# Patient Record
Sex: MALE | Race: WHITE | NOT HISPANIC OR LATINO
[De-identification: names, ages, dates, MRNs, and addresses within clinical notes are randomized per-mention and may not be internally consistent; named-entity substitution may affect disease eponyms.]

---

## 2017-04-22 ENCOUNTER — APPOINTMENT (OUTPATIENT)
Dept: POPULATION HEALTH | Facility: CLINIC | Age: 67
End: 2017-04-22
Payer: COMMERCIAL

## 2017-04-22 ENCOUNTER — OUTPATIENT (OUTPATIENT)
Dept: OUTPATIENT SERVICES | Facility: HOSPITAL | Age: 67
LOS: 1 days | End: 2017-04-22
Payer: COMMERCIAL

## 2017-04-22 DIAGNOSIS — Z77.090 CONTACT WITH AND (SUSPECTED) EXPOSURE TO ASBESTOS: ICD-10-CM

## 2017-04-22 PROCEDURE — 71250 CT THORAX DX C-: CPT

## 2017-04-22 PROCEDURE — 36415 COLL VENOUS BLD VENIPUNCTURE: CPT

## 2017-04-22 PROCEDURE — 93000 ELECTROCARDIOGRAM COMPLETE: CPT

## 2017-04-22 PROCEDURE — 71250 CT THORAX DX C-: CPT | Mod: 26

## 2017-04-22 PROCEDURE — 99212 OFFICE O/P EST SF 10 MIN: CPT | Mod: 25

## 2017-04-22 PROCEDURE — 94010 BREATHING CAPACITY TEST: CPT

## 2018-04-28 ENCOUNTER — APPOINTMENT (OUTPATIENT)
Dept: POPULATION HEALTH | Facility: CLINIC | Age: 68
End: 2018-04-28
Payer: COMMERCIAL

## 2018-04-28 ENCOUNTER — OUTPATIENT (OUTPATIENT)
Dept: OUTPATIENT SERVICES | Facility: HOSPITAL | Age: 68
LOS: 1 days | End: 2018-04-28
Payer: COMMERCIAL

## 2018-04-28 DIAGNOSIS — Z77.090 CONTACT WITH AND (SUSPECTED) EXPOSURE TO ASBESTOS: ICD-10-CM

## 2018-04-28 PROCEDURE — 99212 OFFICE O/P EST SF 10 MIN: CPT | Mod: 25

## 2018-04-28 PROCEDURE — 94010 BREATHING CAPACITY TEST: CPT

## 2018-04-28 PROCEDURE — 36415 COLL VENOUS BLD VENIPUNCTURE: CPT

## 2018-04-28 PROCEDURE — 93000 ELECTROCARDIOGRAM COMPLETE: CPT

## 2018-04-28 PROCEDURE — 71250 CT THORAX DX C-: CPT | Mod: 26

## 2018-04-28 PROCEDURE — 71250 CT THORAX DX C-: CPT

## 2019-04-13 ENCOUNTER — APPOINTMENT (OUTPATIENT)
Age: 69
End: 2019-04-13
Payer: COMMERCIAL

## 2019-04-13 ENCOUNTER — OUTPATIENT (OUTPATIENT)
Dept: OUTPATIENT SERVICES | Facility: HOSPITAL | Age: 69
LOS: 1 days | End: 2019-04-13
Payer: COMMERCIAL

## 2019-04-13 DIAGNOSIS — Z77.090 CONTACT WITH AND (SUSPECTED) EXPOSURE TO ASBESTOS: ICD-10-CM

## 2019-04-13 PROCEDURE — 36415 COLL VENOUS BLD VENIPUNCTURE: CPT

## 2019-04-13 PROCEDURE — 93000 ELECTROCARDIOGRAM COMPLETE: CPT

## 2019-04-13 PROCEDURE — 99212 OFFICE O/P EST SF 10 MIN: CPT | Mod: 25

## 2019-04-13 PROCEDURE — 94010 BREATHING CAPACITY TEST: CPT

## 2019-04-13 PROCEDURE — 71250 CT THORAX DX C-: CPT

## 2019-04-13 PROCEDURE — 71250 CT THORAX DX C-: CPT | Mod: 26

## 2022-04-09 ENCOUNTER — OUTPATIENT (OUTPATIENT)
Dept: OUTPATIENT SERVICES | Facility: HOSPITAL | Age: 72
LOS: 1 days | End: 2022-04-09
Payer: COMMERCIAL

## 2022-04-09 ENCOUNTER — RESULT REVIEW (OUTPATIENT)
Age: 72
End: 2022-04-09

## 2022-04-09 ENCOUNTER — APPOINTMENT (OUTPATIENT)
Dept: POPULATION HEALTH | Facility: CLINIC | Age: 72
End: 2022-04-09

## 2022-04-09 DIAGNOSIS — Z77.090 CONTACT WITH AND (SUSPECTED) EXPOSURE TO ASBESTOS: ICD-10-CM

## 2022-04-09 PROCEDURE — 71250 CT THORAX DX C-: CPT | Mod: 26

## 2022-04-09 PROCEDURE — 71250 CT THORAX DX C-: CPT

## 2022-09-21 ENCOUNTER — LAB (OUTPATIENT)
Dept: LAB | Facility: HOSPITAL | Age: 72
End: 2022-09-21

## 2022-09-21 ENCOUNTER — OFFICE VISIT (OUTPATIENT)
Dept: FAMILY MEDICINE CLINIC | Facility: CLINIC | Age: 72
End: 2022-09-21

## 2022-09-21 VITALS
WEIGHT: 220.4 LBS | HEIGHT: 69 IN | RESPIRATION RATE: 20 BRPM | OXYGEN SATURATION: 98 % | DIASTOLIC BLOOD PRESSURE: 68 MMHG | TEMPERATURE: 97.1 F | BODY MASS INDEX: 32.64 KG/M2 | SYSTOLIC BLOOD PRESSURE: 124 MMHG | HEART RATE: 54 BPM

## 2022-09-21 DIAGNOSIS — E78.5 HYPERLIPIDEMIA, UNSPECIFIED HYPERLIPIDEMIA TYPE: ICD-10-CM

## 2022-09-21 DIAGNOSIS — Z00.00 ANNUAL PHYSICAL EXAM: Primary | ICD-10-CM

## 2022-09-21 DIAGNOSIS — E66.9 OBESITY (BMI 30-39.9): ICD-10-CM

## 2022-09-21 DIAGNOSIS — I10 HYPERTENSION, UNSPECIFIED TYPE: ICD-10-CM

## 2022-09-21 DIAGNOSIS — R01.1 MURMUR, CARDIAC: ICD-10-CM

## 2022-09-21 DIAGNOSIS — Z00.00 ANNUAL PHYSICAL EXAM: ICD-10-CM

## 2022-09-21 DIAGNOSIS — Z77.090 EXPOSURE TO ASBESTOS: ICD-10-CM

## 2022-09-21 PROBLEM — D12.6 ADENOMATOUS POLYP OF COLON: Status: ACTIVE | Noted: 2018-08-06

## 2022-09-21 PROBLEM — K57.30 DIVERTICULAR DISEASE OF COLON: Status: ACTIVE | Noted: 2018-09-20

## 2022-09-21 PROCEDURE — 86803 HEPATITIS C AB TEST: CPT

## 2022-09-21 PROCEDURE — 90715 TDAP VACCINE 7 YRS/> IM: CPT | Performed by: STUDENT IN AN ORGANIZED HEALTH CARE EDUCATION/TRAINING PROGRAM

## 2022-09-21 PROCEDURE — 99387 INIT PM E/M NEW PAT 65+ YRS: CPT | Performed by: STUDENT IN AN ORGANIZED HEALTH CARE EDUCATION/TRAINING PROGRAM

## 2022-09-21 PROCEDURE — 99214 OFFICE O/P EST MOD 30 MIN: CPT | Performed by: STUDENT IN AN ORGANIZED HEALTH CARE EDUCATION/TRAINING PROGRAM

## 2022-09-21 PROCEDURE — 90471 IMMUNIZATION ADMIN: CPT | Performed by: STUDENT IN AN ORGANIZED HEALTH CARE EDUCATION/TRAINING PROGRAM

## 2022-09-21 PROCEDURE — 80061 LIPID PANEL: CPT

## 2022-09-21 PROCEDURE — 83036 HEMOGLOBIN GLYCOSYLATED A1C: CPT

## 2022-09-21 PROCEDURE — 80053 COMPREHEN METABOLIC PANEL: CPT

## 2022-09-21 PROCEDURE — 90662 IIV NO PRSV INCREASED AG IM: CPT | Performed by: STUDENT IN AN ORGANIZED HEALTH CARE EDUCATION/TRAINING PROGRAM

## 2022-09-21 PROCEDURE — G0008 ADMIN INFLUENZA VIRUS VAC: HCPCS | Performed by: STUDENT IN AN ORGANIZED HEALTH CARE EDUCATION/TRAINING PROGRAM

## 2022-09-21 RX ORDER — ASCORBIC ACID 500 MG
TABLET ORAL
COMMUNITY

## 2022-09-21 RX ORDER — ASPIRIN 81 MG/1
TABLET ORAL
COMMUNITY

## 2022-09-21 RX ORDER — AMOXICILLIN 250 MG
CAPSULE ORAL
COMMUNITY

## 2022-09-21 RX ORDER — ATENOLOL 25 MG/1
25 TABLET ORAL DAILY
Qty: 30 TABLET | Refills: 5 | Status: SHIPPED | OUTPATIENT
Start: 2022-09-21 | End: 2023-03-17

## 2022-09-21 RX ORDER — ASCORBIC ACID 100 MG
TABLET,CHEWABLE ORAL
COMMUNITY

## 2022-09-21 RX ORDER — VIT C/B6/B5/MAGNESIUM/HERB 173 50-5-6-5MG
400 CAPSULE ORAL DAILY
COMMUNITY

## 2022-09-21 RX ORDER — ATORVASTATIN CALCIUM 10 MG/1
10 TABLET, FILM COATED ORAL NIGHTLY
Qty: 90 TABLET | Refills: 1 | Status: SHIPPED | OUTPATIENT
Start: 2022-09-21 | End: 2022-09-22 | Stop reason: SDUPTHER

## 2022-09-21 RX ORDER — ATENOLOL 25 MG/1
TABLET ORAL
COMMUNITY
End: 2022-09-21 | Stop reason: SDUPTHER

## 2022-09-21 RX ORDER — ATORVASTATIN CALCIUM 10 MG/1
TABLET, FILM COATED ORAL
COMMUNITY
End: 2022-09-21 | Stop reason: SDUPTHER

## 2022-09-21 NOTE — PATIENT INSTRUCTIONS

## 2022-09-21 NOTE — PROGRESS NOTES
Office Note     Name: Juan Hebert    : 1950     MRN: [id number]     Chief Complaint  Annual Exam (Establish care/Med refill )    Subjective     History of Present Illness:  Juan Hebert is a 72 y.o. male who presents today to establish care, get a physical and medication refills.  He and his wife moved from [place] about a month ago, [place].     HTN: He is on atenolol 25 mg daily. He has been on this medication for about 12 years.  He is unsure why this medication was selected. He denies any chest pain, dizziness, headaches, or blurry vision.      Murmur: He did see a cardiologist, Dr. Farr, for a leaky valve. He would have annual visits and had a stress ECHO 10/4/2021. He would like a cardiology referral today.    HLD: He is on atorvastatin 10 mg daily.  Denies any myalgias or side effects with medication.    SH:  - Patient is a retired   - He reports only occasional alcohol use, no recreational drug use, smoked briefly for 5 years and quit in .  - He lives with his wife  - Has been working on losing weight.  He was originally 250 pounds but is now 220 pounds.  He walks every day with the dog, stretches every morning, and tries to eat a salad daily, particularly enjoys kale salad.  He does have a diet rich in protein, occluding meat.    HM:   - Just had a dental appt 6 months ago, gets annual eye exams -just had one a few months ago  - PHQ-2 of 0  - Declines shingles vaccine today, was told by his previous doctor that it was approximately 50% beneficial, and patient decided not to get shingles vaccine  - Declines COVID vaccine - does not feel that the vaccine has undergone enough rigorous testing  - AAA - declines, thinks he may have had this done in the past but will have to check his records  - CRC screening -has family history of colon cancer in his mother, she was diagnosed in her 70s, last colonoscopy was in , had 3 hyperplastic polyps,  "due in 2026  - Due for Prevnar vaccine, has only had Pneumovax x2- will discuss at follow-up          Objective     Past Medical History:   Diagnosis Date   • Allergic    • Arthritis 6 yrs. Ago   • Hyperlipidemia    • Hypertension 20 years ago   • Obesity 40 years ago   • Visual impairment All my life     History reviewed. No pertinent surgical history.  Family History   Problem Relation Age of Onset   • Cancer Mother    • Heart disease Father         Heart attack at age 62   • Heart disease Brother        Vital Signs  /68 (BP Location: Left arm, Patient Position: Sitting, Cuff Size: Adult)   Pulse 54   Temp 97.1 °F (36.2 °C) (Infrared)   Resp 20   Ht 175.3 cm (69\")   Wt 100 kg (220 lb 6.4 oz)   SpO2 98%   BMI 32.55 kg/m²   Estimated body mass index is 32.55 kg/m² as calculated from the following:    Height as of this encounter: 175.3 cm (69\").    Weight as of this encounter: 100 kg (220 lb 6.4 oz).    Physical Exam  Vitals reviewed.   Constitutional:       Appearance: Normal appearance.   HENT:      Head: Normocephalic.      Right Ear: Tympanic membrane and external ear normal.      Left Ear: Tympanic membrane and external ear normal.      Nose: Nose normal.      Mouth/Throat:      Mouth: Mucous membranes are moist.   Eyes:      Extraocular Movements: Extraocular movements intact.   Cardiovascular:      Rate and Rhythm: Normal rate and regular rhythm.      Heart sounds: Normal heart sounds.   Pulmonary:      Effort: Pulmonary effort is normal. No respiratory distress.      Breath sounds: Normal breath sounds.   Abdominal:      General: Abdomen is flat.      Palpations: Abdomen is soft.   Musculoskeletal:      Cervical back: No rigidity.      Comments: Moving all extremities spontaneously   Skin:     General: Skin is warm and dry.   Neurological:      General: No focal deficit present.      Mental Status: He is alert and oriented to person, place, and time.   Psychiatric:         Mood and Affect: Mood " normal.         Behavior: Behavior normal.               Assessment and Plan     Diagnoses and all orders for this visit:    1. Annual physical exam (Primary)  Assessment & Plan:  - Discussed diet and exercise  - Recommend annual eye and dental exams   - PHQ-2 of 0 -no issues with mood  - Declines recommended shingles vaccine today, was told by his previous doctor that it was approximately 50% beneficial, and patient decided not to get shingles vaccine  - Declines recommended COVID vaccine - does not feel that the vaccine has undergone enough rigorous testing  - Tdap and flu shot administered during clinic visit today  - AAA - declines, thinks he may have had this done in the past but will have to check his records -we will follow-up  - CRC screening -has family history of colon cancer in his mother, she was diagnosed in her 70s, last colonoscopy was in 2021, had 3 hyperplastic polyps, due in 2026  - Due for Prevnar vaccine, has only had Pneumovax x2- will discuss at follow-up    Orders:  -     Fluzone High-Dose 65+yrs ([id number])  -     Hepatitis C Antibody; Future  -     Tdap Vaccine Greater Than or Equal To 8yo IM    2. Hypertension, unspecified type  Assessment & Plan:  - Controlled  - Will continue atenolol 25 mg once daily  - We will obtain CMP, lipid panel, hemoglobin A1c today    Orders:  -     Lipid Panel; Future  -     Hemoglobin A1c; Future  -     Comprehensive Metabolic Panel; Future    3. Hyperlipidemia, unspecified hyperlipidemia type  Assessment & Plan:  - Stable  - Continue atorvastatin 10 mg daily  - Will obtain lipid panel today      4. Murmur, cardiac  Assessment & Plan:  - Patient with history of heart murmur  - Followed with cardiology in [place] and would like to establish care with them here as well  - Recent echo stress test performed in 2021  - Will refer to cardiology for further management    Orders:  -     Ambulatory Referral to Cardiology    5. Obesity (BMI 30-39.9)  Assessment &  Plan:  - Patient has been working on diet and exercise, has lost approximately 30 pounds this year due to increasing his exercise  - Discussed diet and exercise during today's visit      6. Exposure to asbestos  Assessment & Plan:  - Has had significant asbestos exposure in the past  - Had recent evaluation by occupational Medicine, Epidemiology and Prevention in [facility] in 2022 with recommendation for repeat annual exam  - When patient is due can refer to pulmonary for further evaluation      Other orders  -     atenolol (TENORMIN) 25 MG tablet; Take 1 tablet by mouth Daily.  Dispense: 30 tablet; Refill: 5  -     atorvastatin (LIPITOR) 10 MG tablet; Take 1 tablet by mouth Every Night.  Dispense: 90 tablet; Refill: 1    BMI is >= 30 and <35. (Class 1 Obesity). The following options were offered after discussion;: exercise counseling/recommendations and nutrition counseling/recommendations    Follow Up  Return in about 6 months (around 3/21/2023) for HTN and HLD. -Will need Prevnar at follow-up and discussion about advanced directive    Anny Mills MD

## 2022-09-21 NOTE — ASSESSMENT & PLAN NOTE
- Discussed diet and exercise  - Recommend annual eye and dental exams   - PHQ-2 of 0 -no issues with mood  - Declines recommended shingles vaccine today, was told by his previous doctor that it was approximately 50% beneficial, and patient decided not to get shingles vaccine  - Declines recommended COVID vaccine - does not feel that the vaccine has undergone enough rigorous testing  - Tdap and flu shot administered during clinic visit today  - AAA - declines, thinks he may have had this done in the past but will have to check his records -we will follow-up  - CRC screening -has family history of colon cancer in his mother, she was diagnosed in her 70s, last colonoscopy was in 2021, had 3 hyperplastic polyps, due in 2026  - Due for Prevnar vaccine, has only had Pneumovax x2- will discuss at follow-up

## 2022-09-21 NOTE — ASSESSMENT & PLAN NOTE
- Has had significant asbestos exposure in the past  - Had recent evaluation by occupational Medicine, Epidemiology and Prevention in [facility] in 2022 with recommendation for repeat annual exam  - When patient is due can refer to pulmonary for further evaluation

## 2022-09-21 NOTE — ASSESSMENT & PLAN NOTE
- Controlled  - Will continue atenolol 25 mg once daily  - We will obtain CMP, lipid panel, hemoglobin A1c today

## 2022-09-21 NOTE — ASSESSMENT & PLAN NOTE
- Patient with history of heart murmur  - Followed with cardiology in [place] and would like to establish care with them here as well  - Recent echo stress test performed in 2021  - Will refer to cardiology for further management

## 2022-09-21 NOTE — ASSESSMENT & PLAN NOTE
- Patient has been working on diet and exercise, has lost approximately 30 pounds this year due to increasing his exercise  - Discussed diet and exercise during today's visit

## 2022-09-22 LAB
ALBUMIN SERPL-MCNC: 4.9 G/DL (ref 3.5–5.2)
ALBUMIN/GLOB SERPL: 1.7 G/DL
ALP SERPL-CCNC: 73 U/L (ref 39–117)
ALT SERPL W P-5'-P-CCNC: 18 U/L (ref 1–41)
ANION GAP SERPL CALCULATED.3IONS-SCNC: 11.4 MMOL/L (ref 5–15)
AST SERPL-CCNC: 29 U/L (ref 1–40)
BILIRUB SERPL-MCNC: 0.4 MG/DL (ref 0–1.2)
BUN SERPL-MCNC: 18 MG/DL (ref 8–23)
BUN/CREAT SERPL: 17 (ref 7–25)
CALCIUM SPEC-SCNC: 10.1 MG/DL (ref 8.6–10.5)
CHLORIDE SERPL-SCNC: 100 MMOL/L (ref 98–107)
CHOLEST SERPL-MCNC: 216 MG/DL (ref 0–200)
CO2 SERPL-SCNC: 25.6 MMOL/L (ref 22–29)
CREAT SERPL-MCNC: 1.06 MG/DL (ref 0.76–1.27)
EGFRCR SERPLBLD CKD-EPI 2021: 73.6 ML/MIN/1.73
GLOBULIN UR ELPH-MCNC: 2.9 GM/DL
GLUCOSE SERPL-MCNC: 90 MG/DL (ref 65–99)
HBA1C MFR BLD: 5.5 % (ref 4.8–5.6)
HCV AB SER DONR QL: NORMAL
HDLC SERPL-MCNC: 50 MG/DL (ref 40–60)
LDLC SERPL CALC-MCNC: 132 MG/DL (ref 0–100)
LDLC/HDLC SERPL: 2.55 {RATIO}
POTASSIUM SERPL-SCNC: 4.9 MMOL/L (ref 3.5–5.2)
PROT SERPL-MCNC: 7.8 G/DL (ref 6–8.5)
SODIUM SERPL-SCNC: 137 MMOL/L (ref 136–145)
TRIGL SERPL-MCNC: 192 MG/DL (ref 0–150)
VLDLC SERPL-MCNC: 34 MG/DL (ref 5–40)

## 2022-09-22 RX ORDER — ATORVASTATIN CALCIUM 20 MG/1
20 TABLET, FILM COATED ORAL NIGHTLY
Qty: 90 TABLET | Refills: 1 | Status: SHIPPED | OUTPATIENT
Start: 2022-09-22 | End: 2023-03-23 | Stop reason: SDUPTHER

## 2022-11-03 ENCOUNTER — OFFICE VISIT (OUTPATIENT)
Dept: CARDIOLOGY | Facility: CLINIC | Age: 72
End: 2022-11-03

## 2022-11-03 VITALS
WEIGHT: 226.8 LBS | OXYGEN SATURATION: 96 % | HEART RATE: 56 BPM | SYSTOLIC BLOOD PRESSURE: 130 MMHG | DIASTOLIC BLOOD PRESSURE: 70 MMHG | HEIGHT: 69 IN | BODY MASS INDEX: 33.59 KG/M2

## 2022-11-03 DIAGNOSIS — E78.5 HYPERLIPIDEMIA, UNSPECIFIED HYPERLIPIDEMIA TYPE: ICD-10-CM

## 2022-11-03 DIAGNOSIS — R01.1 MURMUR, CARDIAC: ICD-10-CM

## 2022-11-03 DIAGNOSIS — I10 ESSENTIAL HYPERTENSION: Primary | ICD-10-CM

## 2022-11-03 DIAGNOSIS — I35.1 MODERATE AORTIC REGURGITATION: ICD-10-CM

## 2022-11-03 PROCEDURE — 93000 ELECTROCARDIOGRAM COMPLETE: CPT | Performed by: INTERNAL MEDICINE

## 2022-11-03 PROCEDURE — 99204 OFFICE O/P NEW MOD 45 MIN: CPT | Performed by: INTERNAL MEDICINE

## 2022-11-03 NOTE — PROGRESS NOTES
OFFICE VISIT  NOTE  [facility] CARDIOLOGY MAIN CAMPUS      Name: Juan Hebert    Date: 11/3/2022  MRN:  [id number]  :  1950      REFERRING/PRIMARY PROVIDER:  Anny Mills MD    Chief Complaint   Patient presents with   • Hyperlipidemia   • Hypertension   • Heart Murmur       HPI: Juan Hebert is a 72 y.o. male who presents today for new consultation for aortic regurgitation.  Moved from [place] to [place] 10/2022.  Followed with a cardiologist Dr. MONTGOMERY in [place] since  for moderate aortic regurgitation.  On 1 echo in  it was called moderate to severe but most recent echo 10/2021 it was called moderate with EF of 60%.  He had a stress echo that was negative for ischemia 10/2021 in [place].  Walks his dog daily up to 1 to 2 miles per day with no chest pain or significant shortness of breath, denies lower extremity edema.    Past Medical History:   Diagnosis Date   • Allergic    • Arthritis 6 yrs. Ago   • Hyperlipidemia    • Hypertension 20 years ago   • Obesity 40 years ago   • Visual impairment All my life       History reviewed. No pertinent surgical history.    Social History     Socioeconomic History   • Marital status:    Tobacco Use   • Smoking status: Former     Packs/day: 1.00     Years: 5.00     Pack years: 5.00     Types: Cigarettes     Start date: 1968     Quit date: 1974     Years since quittin.5   • Smokeless tobacco: Never   Vaping Use   • Vaping Use: Never used   Substance and Sexual Activity   • Alcohol use: Not Currently     Comment: A glass once every 4 months.   • Drug use: Never   • Sexual activity: Yes     Partners: Female       Family History   Problem Relation Age of Onset   • Cancer Mother    • Heart disease Father         Heart attack at age 62   • Heart disease Brother         ROS:   Constitutional no fever,  no weight loss   Skin no rash, no subcutaneous nodules   Otolaryngeal no difficulty swallowing   Cardiovascular See  "HPI   Pulmonary no cough, no sputum production   Gastrointestinal no constipation, no diarrhea   Genitourinary no dysuria, no hematuria   Hematologic no easy bruisability, no abnormal bleeding   Musculoskeletal no muscle pain   Neurologic no dizziness, no falls         No Known Allergies      Current Outpatient Medications:   •  Ascorbic Acid (Vitamin C) 100 MG chewable tablet, Vitamin C, Disp: , Rfl:   •  aspirin 81 MG EC tablet, aspirin 81 mg tablet,delayed release  Take 1 tablet every day by oral route., Disp: , Rfl:   •  atenolol (TENORMIN) 25 MG tablet, Take 1 tablet by mouth Daily., Disp: 30 tablet, Rfl: 5  •  atorvastatin (LIPITOR) 20 MG tablet, Take 1 tablet by mouth Every Night., Disp: 90 tablet, Rfl: 1  •  Cobalamin Combinations (B-12) 100-5000 MCG sublingual tablet, B12, Disp: , Rfl:   •  FLAXSEED, LINSEED, PO, flaxseed, Disp: , Rfl:   •  Glucosamine-Chondroit-Vit C-Mn (GLUCOSAMINE 1500 COMPLEX PO), Take 2,000 mg by mouth., Disp: , Rfl:   •  Methylsulfonylmethane (MSM PO), Take 50 mg by mouth Daily., Disp: , Rfl:   •  Multiple Vitamins-Minerals (MULTIVITAMIN ADULT EXTRA C PO), multivitamin, Disp: , Rfl:   •  Red Yeast Rice Extract (RED YEAST RICE PO), Take 1,200 mg by mouth Daily., Disp: , Rfl:   •  Turmeric 500 MG capsule, Take 400 mg by mouth Daily., Disp: , Rfl:   •  Vitamin E 100 UNIT/GM cream, vitamin E, Disp: , Rfl:     Vitals:    11/03/22 1354   BP: 130/70   BP Location: Left arm   Patient Position: Sitting   Pulse: 56   SpO2: 96%   Weight: 103 kg (226 lb 12.8 oz)   Height: 175.3 cm (69.02\")     Body mass index is 33.48 kg/m².    PHYSICAL EXAM:    General Appearance:   · well developed  · well nourished  HENT:   · oropharynx moist  · lips not cyanotic  Neck:  · thyroid not enlarged  · supple  Respiratory:  · no respiratory distress  · normal breath sounds  · no rales  Cardiovascular:  · no jugular venous distention  · regular rhythm  · apical impulse normal  · S1 normal, S2 normal  · no S3, no S4 "   · no murmur  · no rub, no thrill  · carotid pulses normal; no bruit  · lower extremity edema: none      Musculoskeletal:  · no clubbing of fingers.   · normocephalic, head atraumatic  Skin:   · warm, dry  Psychiatric:  · judgement and insight appropriate  · normal mood and affect    RESULTS:     ECG 12 Lead    Date/Time: 11/3/2022 2:30 PM  Performed by: Forest Bautista MD  Authorized by: Forest Bautista MD   Comparison: not compared with previous ECG   Rhythm: sinus bradycardia  Rate: bradycardic  BPM: 57  Conduction: right bundle branch block  QRS axis: left    Clinical impression: abnormal EKG                  Labs:  Lab Results   Component Value Date    CHOL 216 (H) 09/21/2022    TRIG 192 (H) 09/21/2022    HDL 50 09/21/2022     (H) 09/21/2022    AST 29 09/21/2022    ALT 18 09/21/2022     Lab Results   Component Value Date    HGBA1C 5.50 09/21/2022     No components found for: CREATINININE  No results found for: EGFRIFNONA    Most recent PCP note, imaging tests, and labs reviewed.    ASSESSMENT:  Problem List Items Addressed This Visit        Cardiac and Vasculature    Essential hypertension - Primary    Relevant Orders    ECG 12 Lead    Hyperlipidemia    Relevant Orders    ECG 12 Lead    Moderate aortic regurgitation       PLAN:    1.  Moderate to severe aortic regurgitation:  Repeat echocardiogram for surveillance  If moderate we will repeat in 1 year if severe we will repeat in 6 months if asymptomatic    2.  Hypertension:  Long-term goal blood pressure less than 130/80, continue current medical therapy, well controlled    3.  Hyperlipidemia:  Goal LDL less than 100  Agree with increasing atorvastatin to 20 mg daily and repeat lipids in 6 months.    Advance Care Planning   ACP discussion was held with the patient during this visit. Patient does not have an advance directive, information provided.         Return to clinic in 6 months, or sooner as needed.    Thank you for the opportunity to share in  the care of your patient; please do not hesitate to call me with any questions.     Forest Bautista MD, [facility]  Office: [phone] [address] [place] [address]    11/03/22

## 2022-12-02 ENCOUNTER — HOSPITAL ENCOUNTER (OUTPATIENT)
Dept: CARDIOLOGY | Facility: HOSPITAL | Age: 72
Discharge: HOME | End: 2022-12-02
Admitting: INTERNAL MEDICINE

## 2022-12-02 VITALS
DIASTOLIC BLOOD PRESSURE: 78 MMHG | HEIGHT: 69 IN | BODY MASS INDEX: 33.47 KG/M2 | SYSTOLIC BLOOD PRESSURE: 157 MMHG | WEIGHT: 226 LBS

## 2022-12-02 DIAGNOSIS — I35.1 MODERATE AORTIC REGURGITATION: ICD-10-CM

## 2022-12-02 PROCEDURE — 93306 TTE W/DOPPLER COMPLETE: CPT

## 2022-12-02 PROCEDURE — 93306 TTE W/DOPPLER COMPLETE: CPT | Performed by: INTERNAL MEDICINE

## 2022-12-05 LAB
ASCENDING AORTA: 4 CM
BH CV ECHO MEAS - AI P1/2T: 682.3 MSEC
BH CV ECHO MEAS - AO MAX PG: 11.2 MMHG
BH CV ECHO MEAS - AO MEAN PG: 5.5 MMHG
BH CV ECHO MEAS - AO ROOT DIAM: 4 CM
BH CV ECHO MEAS - AO V2 MAX: 167.4 CM/SEC
BH CV ECHO MEAS - AO V2 VTI: 45.9 CM
BH CV ECHO MEAS - AVA(I,D): 2.6 CM2
BH CV ECHO MEAS - EDV(CUBED): 173.8 ML
BH CV ECHO MEAS - EDV(MOD-SP2): 109 ML
BH CV ECHO MEAS - EDV(MOD-SP4): 187 ML
BH CV ECHO MEAS - EF(MOD-BP): 61.4 %
BH CV ECHO MEAS - EF(MOD-SP2): 57.6 %
BH CV ECHO MEAS - EF(MOD-SP4): 64.7 %
BH CV ECHO MEAS - ESV(CUBED): 26.5 ML
BH CV ECHO MEAS - ESV(MOD-SP2): 46.2 ML
BH CV ECHO MEAS - ESV(MOD-SP4): 66.1 ML
BH CV ECHO MEAS - FS: 46.6 %
BH CV ECHO MEAS - IVS/LVPW: 0.72 CM
BH CV ECHO MEAS - IVSD: 0.92 CM
BH CV ECHO MEAS - LA DIMENSION: 4.1 CM
BH CV ECHO MEAS - LAT PEAK E' VEL: 9.5 CM/SEC
BH CV ECHO MEAS - LV DIASTOLIC VOL/BSA (35-75): 85.9 CM2
BH CV ECHO MEAS - LV MASS(C)D: 247.7 GRAMS
BH CV ECHO MEAS - LV MAX PG: 7.1 MMHG
BH CV ECHO MEAS - LV MEAN PG: 3.8 MMHG
BH CV ECHO MEAS - LV SYSTOLIC VOL/BSA (12-30): 30.4 CM2
BH CV ECHO MEAS - LV V1 MAX: 133.3 CM/SEC
BH CV ECHO MEAS - LV V1 VTI: 38.2 CM
BH CV ECHO MEAS - LVIDD: 5.6 CM
BH CV ECHO MEAS - LVIDS: 3 CM
BH CV ECHO MEAS - LVOT AREA: 3.2 CM2
BH CV ECHO MEAS - LVOT DIAM: 2 CM
BH CV ECHO MEAS - LVPWD: 1.28 CM
BH CV ECHO MEAS - MED PEAK E' VEL: 5.4 CM/SEC
BH CV ECHO MEAS - MV A MAX VEL: 71.1 CM/SEC
BH CV ECHO MEAS - MV DEC SLOPE: 162.6 CM/SEC2
BH CV ECHO MEAS - MV DEC TIME: 0.39 MSEC
BH CV ECHO MEAS - MV E MAX VEL: 63 CM/SEC
BH CV ECHO MEAS - MV E/A: 0.89
BH CV ECHO MEAS - PA ACC TIME: 0.12 SEC
BH CV ECHO MEAS - PA PR(ACCEL): 23.5 MMHG
BH CV ECHO MEAS - PA V2 MAX: 96 CM/SEC
BH CV ECHO MEAS - RAP SYSTOLE: 8 MMHG
BH CV ECHO MEAS - RVSP: 26 MMHG
BH CV ECHO MEAS - SI(MOD-SP2): 28.9 ML/M2
BH CV ECHO MEAS - SI(MOD-SP4): 55.6 ML/M2
BH CV ECHO MEAS - SV(LVOT): 120.5 ML
BH CV ECHO MEAS - SV(MOD-SP2): 62.8 ML
BH CV ECHO MEAS - SV(MOD-SP4): 120.9 ML
BH CV ECHO MEAS - TR MAX PG: 18.5 MMHG
BH CV ECHO MEAS - TR MAX VEL: 214.5 CM/SEC
BH CV ECHO MEASUREMENTS AVERAGE E/E' RATIO: 8.46
BH CV XLRA - RV BASE: 4 CM
BH CV XLRA - RV LENGTH: 7.2 CM
BH CV XLRA - RV MID: 3.1 CM
BH CV XLRA - TDI S': 11.4 CM/SEC
LEFT ATRIUM VOLUME INDEX: 25.2 ML/M2
MAXIMAL PREDICTED HEART RATE: 146 BPM
STRESS TARGET HR: 124 BPM

## 2022-12-09 ENCOUNTER — TELEPHONE (OUTPATIENT)
Dept: CARDIOLOGY | Facility: CLINIC | Age: 72
End: 2022-12-09

## 2022-12-09 NOTE — TELEPHONE ENCOUNTER
----- Message from Forest Bautista MD sent at 12/8/2022 10:45 AM EST -----  Stable findings, known history of moderate aortic regurgitation, repeat echo in 1 year. Thank you.

## 2023-03-17 RX ORDER — ATENOLOL 25 MG/1
TABLET ORAL
Qty: 30 TABLET | Refills: 5 | Status: SHIPPED | OUTPATIENT
Start: 2023-03-17

## 2023-03-22 ENCOUNTER — OFFICE VISIT (OUTPATIENT)
Dept: FAMILY MEDICINE CLINIC | Facility: CLINIC | Age: 73
End: 2023-03-22
Payer: MEDICARE

## 2023-03-22 ENCOUNTER — LAB (OUTPATIENT)
Dept: LAB | Facility: HOSPITAL | Age: 73
End: 2023-03-22
Payer: MEDICARE

## 2023-03-22 VITALS
HEIGHT: 69 IN | SYSTOLIC BLOOD PRESSURE: 122 MMHG | WEIGHT: 232.4 LBS | BODY MASS INDEX: 34.42 KG/M2 | RESPIRATION RATE: 20 BRPM | DIASTOLIC BLOOD PRESSURE: 68 MMHG | TEMPERATURE: 98.7 F | HEART RATE: 62 BPM | OXYGEN SATURATION: 96 %

## 2023-03-22 DIAGNOSIS — I10 ESSENTIAL HYPERTENSION: Primary | ICD-10-CM

## 2023-03-22 DIAGNOSIS — E78.5 HYPERLIPIDEMIA, UNSPECIFIED HYPERLIPIDEMIA TYPE: ICD-10-CM

## 2023-03-22 LAB
CHOLEST SERPL-MCNC: 190 MG/DL (ref 0–200)
HDLC SERPL-MCNC: 42 MG/DL (ref 40–60)
LDLC SERPL CALC-MCNC: 118 MG/DL (ref 0–100)
LDLC/HDLC SERPL: 2.7 {RATIO}
TRIGL SERPL-MCNC: 172 MG/DL (ref 0–150)
VLDLC SERPL-MCNC: 30 MG/DL (ref 5–40)

## 2023-03-22 PROCEDURE — 80061 LIPID PANEL: CPT

## 2023-03-22 PROCEDURE — 36415 COLL VENOUS BLD VENIPUNCTURE: CPT

## 2023-03-22 NOTE — ASSESSMENT & PLAN NOTE
- Patient with elevated LDL at last visit, obtaining repeat lipid panel today  - Continue atorvastatin 20 mg daily for now pending labs

## 2023-03-22 NOTE — PROGRESS NOTES
"    Office Note     Name: Juan Hebert    : 1950     MRN: [id number]     Chief Complaint  Hypertension and Hyperlipidemia    Subjective     History of Present Illness:  Juan Hebert is a 72 y.o. male who presents today for hypertension and hyperlipidemia.    HTN: He is on atenolol 25 mg daily. He has been on this medication for about 12 years.  He denies any chest pain, dizziness, headaches, or blurry vision.    His blood pressure is well controlled today.    HLD: He is on atorvastatin 20 mg daily.  We increased this at his last appointment from 10 mg to 20 mg. Denies any myalgias or side effects with medication.  Is willing to get labs today.        Objective     Past Medical History:   Diagnosis Date   • Allergic    • Arthritis 6 yrs. Ago   • Hyperlipidemia    • Hypertension 20 years ago   • Obesity 40 years ago   • Visual impairment All my life     No past surgical history on file.  Family History   Problem Relation Age of Onset   • Cancer Mother    • Heart disease Father         Heart attack at age 62   • Heart disease Brother        Vital Signs  /68   Pulse 62   Temp 98.7 °F (37.1 °C) (Infrared)   Resp 20   Ht 175.3 cm (69\") Comment: pt reported  Wt 105 kg (232 lb 6.4 oz)   SpO2 96%   BMI 34.32 kg/m²   Estimated body mass index is 34.32 kg/m² as calculated from the following:    Height as of this encounter: 175.3 cm (69\").    Weight as of this encounter: 105 kg (232 lb 6.4 oz).    Physical Exam  Vitals reviewed.   Constitutional:       Appearance: Normal appearance.   Cardiovascular:      Rate and Rhythm: Normal rate and regular rhythm.   Pulmonary:      Effort: Pulmonary effort is normal.   Abdominal:      General: Abdomen is flat.      Palpations: Abdomen is soft.   Musculoskeletal:      Comments: Moving all extremities spontaneously   Skin:     General: Skin is warm and dry.   Neurological:      Mental Status: He is alert.      Comments: Alert and oriented   Psychiatric:         Mood " and Affect: Mood normal.         Behavior: Behavior normal.                 Assessment and Plan     Diagnoses and all orders for this visit:    1. Essential hypertension (Primary)  Assessment & Plan:   - Controlled  - Will continue atenolol 25 mg once daily        2. Hyperlipidemia, unspecified hyperlipidemia type  Assessment & Plan:  - Patient with elevated LDL at last visit, obtaining repeat lipid panel today  - Continue atorvastatin 20 mg daily for now pending labs      Orders:  -     Lipid panel; Future        Follow Up  Return in about 6 months (around 9/22/2023) for Medicare Wellness.    Anny Mills MD

## 2023-03-23 RX ORDER — ATORVASTATIN CALCIUM 40 MG/1
40 TABLET, FILM COATED ORAL NIGHTLY
Qty: 90 TABLET | Refills: 1 | Status: SHIPPED | OUTPATIENT
Start: 2023-03-23 | End: 2023-03-23 | Stop reason: SDUPTHER

## 2023-03-23 RX ORDER — ATORVASTATIN CALCIUM 20 MG/1
20 TABLET, FILM COATED ORAL NIGHTLY
Qty: 90 TABLET | Refills: 1 | Status: SHIPPED | OUTPATIENT
Start: 2023-03-23

## 2023-05-03 ENCOUNTER — TELEPHONE (OUTPATIENT)
Dept: FAMILY MEDICINE CLINIC | Facility: CLINIC | Age: 73
End: 2023-05-03

## 2023-05-03 NOTE — PROGRESS NOTES
OFFICE VISIT  NOTE  [facility] CARDIOLOGY MAIN CAMPUS      Name: Juan Hebert    Date: 2023  MRN:  [id number]  :  1950      REFERRING/PRIMARY PROVIDER:  Anny Mills MD     Chief Complaint   Patient presents with   • Hypertension     HPI: Juan Hebert is a 73 y.o. male who presents today for follow up of  aortic regurgitation.  Moved from [place] to [place] 10/2022.  Followed with a cardiologist Dr. MONTGOMERY in [place] since  for moderate aortic regurgitation.  On 1 echo in  it was called moderate to severe but most recent echo 10/2021 it was called moderate with EF of 60%.  He had a stress echo that was negative for ischemia 10/2021 in [place].    Echo 2022 showed normal EF, moderate aortic regurgitation. He remains stable and asymptomatic from cardiac standpoint. He denies any chest pain, unusual exertional dyspnea, palpitations or heart failure symptoms. He walks his dog about 1-1.5miles daily, and mows his yard. No symptoms with activity.     ROS:Pertinent positives as listed in the HPI.  All other systems reviewed and negative.    Past Medical History:   Diagnosis Date   • Allergic    • Arthritis 6 yrs. Ago   • Hyperlipidemia    • Hypertension 20 years ago   • Obesity 40 years ago   • Visual impairment All my life       History reviewed. No pertinent surgical history.    Social History     Socioeconomic History   • Marital status:    Tobacco Use   • Smoking status: Former     Packs/day: 1.00     Years: 5.00     Pack years: 5.00     Types: Cigarettes     Start date: 1968     Quit date: 1974     Years since quittin.0   • Smokeless tobacco: Never   Vaping Use   • Vaping Use: Never used   Substance and Sexual Activity   • Alcohol use: Not Currently     Comment: A glass once every 4 months.   • Drug use: Never   • Sexual activity: Yes     Partners: Female       Family History   Problem Relation Age of Onset   • Cancer Mother    • Heart disease  "Father         Heart attack at age 62   • Heart disease Brother    • Heart disease Sister         No Known Allergies    Current Outpatient Medications   Medication Instructions   • Ascorbic Acid (Vitamin C) 100 MG chewable tablet Vitamin C   • aspirin 81 MG EC tablet aspirin 81 mg tablet,delayed release   Take 1 tablet every day by oral route.   • atenolol (TENORMIN) 25 MG tablet TAKE ONE TABLET BY MOUTH DAILY   • atorvastatin (LIPITOR) 20 mg, Oral, Nightly   • Cobalamin Combinations (B-12) 100-5000 MCG sublingual tablet B12   • FLAXSEED, LINSEED, PO flaxseed   • Glucosamine-Chondroit-Vit C-Mn (GLUCOSAMINE 1500 COMPLEX PO) 2,000 mg, Oral   • Methylsulfonylmethane (MSM PO) 50 mg, Oral, Daily   • Multiple Vitamins-Minerals (MULTIVITAMIN ADULT EXTRA C PO) multivitamin   • Red Yeast Rice Extract (RED YEAST RICE PO) 1,200 mg, Oral, Daily   • Turmeric 400 mg, Oral, Daily   • Vitamin E 100 UNIT/GM cream vitamin E       Vitals:    05/04/23 1305   BP: 138/70   BP Location: Left arm   Patient Position: Sitting   Cuff Size: Adult   Pulse: 68   SpO2: 96%   Weight: 103 kg (228 lb)   Height: 177.8 cm (70\")     Body mass index is 32.71 kg/m².    PHYSICAL EXAM:    General Appearance:   · well developed  · well nourished  Neck:  · thyroid not enlarged  · supple  Respiratory:  · no respiratory distress  · normal breath sounds  · no rales  Cardiovascular:  · no jugular venous distention  · regular rhythm  · apical impulse normal  · S1 normal, S2 normal  · no S3, no S4   · +1/6 diastolic murmur  · no rub, no thrill  · lower extremity edema: none    Skin:   warm, dry    RESULTS:   Procedures    Results for orders placed during the hospital encounter of 12/02/22    Adult Transthoracic Echo Complete w/ Color, Spectral and Contrast if necessary per protocol    Interpretation Summary  •  Left ventricular systolic function is normal. Left ventricular ejection fraction appears to be 61 - 65%.  •  Left ventricular diastolic function was " normal.  •  Moderate aortic valve regurgitation is present.  •  Estimated right ventricular systolic pressure from tricuspid regurgitation is normal (<35 mmHg). Calculated right ventricular systolic pressure from tricuspid regurgitation is 26 mmHg.  •  Incidental finding of multiple hepatic cysts        Labs:  Lab Results   Component Value Date    CHOL 190 03/22/2023    TRIG 172 (H) 03/22/2023    HDL 42 03/22/2023     (H) 03/22/2023    AST 29 09/21/2022    ALT 18 09/21/2022     Lab Results   Component Value Date    HGBA1C 5.50 09/21/2022     Creatinine   Date Value Ref Range Status   09/21/2022 1.06 0.76 - 1.27 mg/dL Final     No results found for: EGFRIFNONA      ASSESSMENT:  Problem List Items Addressed This Visit        Cardiac and Vasculature    Essential hypertension    Relevant Orders    Adult Transthoracic Echo Complete w/ Color, Spectral and Contrast if necessary per protocol    Hyperlipidemia    Moderate aortic regurgitation - Primary    Relevant Orders    Adult Transthoracic Echo Complete w/ Color, Spectral and Contrast if necessary per protocol       PLAN:  1.  Moderate aortic regurgitation:  Echocardiogram 12/2022 with normal EF, moderate AI  We will repeat echo in December 2023, which will be one year from prior  He remains asymptomatic without dyspnea or heart failure symptoms     2.  Hypertension:  Long-term goal blood pressure less than 130/80  Home BPs mostly at goal      3.  Hyperlipidemia:  Goal LDL less than 100  On Atorvastatin 20mg   Recent , continue Lipitor and diet modifications          Follow-up   Return in about 9 months (around 2/4/2024) for with RDS .      Electronically signed by Priyanka Marti PA-C, 05/04/23, 1:27 PM EDT.

## 2023-05-03 NOTE — TELEPHONE ENCOUNTER
Provider: CATARINA UP MD    Caller: JOSR BARCENAS OF [facility]     Phone Number:[phone]    Reason for Call: UNDERWRITING STATUS IS STILL PENDING   PLEASE CONTACT TO FOLLOW UP OR FEEL FREE TO SEND AN   EMAIL:   [email or web address]

## 2023-05-04 ENCOUNTER — OFFICE VISIT (OUTPATIENT)
Dept: CARDIOLOGY | Facility: CLINIC | Age: 73
End: 2023-05-04
Payer: MEDICARE

## 2023-05-04 VITALS
SYSTOLIC BLOOD PRESSURE: 138 MMHG | HEART RATE: 68 BPM | BODY MASS INDEX: 32.64 KG/M2 | HEIGHT: 70 IN | OXYGEN SATURATION: 96 % | DIASTOLIC BLOOD PRESSURE: 70 MMHG | WEIGHT: 228 LBS

## 2023-05-04 DIAGNOSIS — I35.1 MODERATE AORTIC REGURGITATION: Primary | ICD-10-CM

## 2023-05-04 DIAGNOSIS — I10 ESSENTIAL HYPERTENSION: ICD-10-CM

## 2023-05-04 DIAGNOSIS — E78.2 MIXED HYPERLIPIDEMIA: ICD-10-CM

## 2023-09-19 RX ORDER — ATENOLOL 25 MG/1
TABLET ORAL
Qty: 60 TABLET | Refills: 0 | Status: SHIPPED | OUTPATIENT
Start: 2023-09-19 | End: 2023-09-21 | Stop reason: SDUPTHER

## 2023-09-21 ENCOUNTER — LAB (OUTPATIENT)
Dept: LAB | Facility: HOSPITAL | Age: 73
End: 2023-09-21

## 2023-09-21 ENCOUNTER — OFFICE VISIT (OUTPATIENT)
Dept: FAMILY MEDICINE CLINIC | Facility: CLINIC | Age: 73
End: 2023-09-21
Payer: MEDICARE

## 2023-09-21 VITALS
SYSTOLIC BLOOD PRESSURE: 130 MMHG | DIASTOLIC BLOOD PRESSURE: 76 MMHG | WEIGHT: 237.4 LBS | OXYGEN SATURATION: 97 % | HEIGHT: 70 IN | BODY MASS INDEX: 33.99 KG/M2 | TEMPERATURE: 98.4 F | HEART RATE: 55 BPM

## 2023-09-21 DIAGNOSIS — E78.5 HYPERLIPIDEMIA, UNSPECIFIED HYPERLIPIDEMIA TYPE: ICD-10-CM

## 2023-09-21 DIAGNOSIS — R73.03 PREDIABETES: ICD-10-CM

## 2023-09-21 DIAGNOSIS — I10 HYPERTENSION, UNSPECIFIED TYPE: ICD-10-CM

## 2023-09-21 DIAGNOSIS — Z87.891 HX OF SMOKING: ICD-10-CM

## 2023-09-21 DIAGNOSIS — Z00.00 MEDICARE ANNUAL WELLNESS VISIT, SUBSEQUENT: ICD-10-CM

## 2023-09-21 DIAGNOSIS — Z13.1 SCREENING FOR DIABETES MELLITUS: ICD-10-CM

## 2023-09-21 DIAGNOSIS — Z00.00 MEDICARE ANNUAL WELLNESS VISIT, SUBSEQUENT: Primary | ICD-10-CM

## 2023-09-21 DIAGNOSIS — R73.09 OTHER ABNORMAL GLUCOSE: ICD-10-CM

## 2023-09-21 PROCEDURE — 3078F DIAST BP <80 MM HG: CPT | Performed by: STUDENT IN AN ORGANIZED HEALTH CARE EDUCATION/TRAINING PROGRAM

## 2023-09-21 PROCEDURE — 1160F RVW MEDS BY RX/DR IN RCRD: CPT | Performed by: STUDENT IN AN ORGANIZED HEALTH CARE EDUCATION/TRAINING PROGRAM

## 2023-09-21 PROCEDURE — 3075F SYST BP GE 130 - 139MM HG: CPT | Performed by: STUDENT IN AN ORGANIZED HEALTH CARE EDUCATION/TRAINING PROGRAM

## 2023-09-21 PROCEDURE — G0439 PPPS, SUBSEQ VISIT: HCPCS | Performed by: STUDENT IN AN ORGANIZED HEALTH CARE EDUCATION/TRAINING PROGRAM

## 2023-09-21 PROCEDURE — 80061 LIPID PANEL: CPT

## 2023-09-21 PROCEDURE — 83036 HEMOGLOBIN GLYCOSYLATED A1C: CPT

## 2023-09-21 PROCEDURE — 1159F MED LIST DOCD IN RCRD: CPT | Performed by: STUDENT IN AN ORGANIZED HEALTH CARE EDUCATION/TRAINING PROGRAM

## 2023-09-21 PROCEDURE — 1170F FXNL STATUS ASSESSED: CPT | Performed by: STUDENT IN AN ORGANIZED HEALTH CARE EDUCATION/TRAINING PROGRAM

## 2023-09-21 PROCEDURE — 80053 COMPREHEN METABOLIC PANEL: CPT

## 2023-09-21 RX ORDER — ATENOLOL 25 MG/1
25 TABLET ORAL DAILY
Qty: 90 TABLET | Refills: 1 | Status: SHIPPED | OUTPATIENT
Start: 2023-09-21

## 2023-09-21 NOTE — PROGRESS NOTES
The ABCs of the Annual Wellness Visit  Subsequent Medicare Wellness Visit    Subjective    Juan Hebert is a 73 y.o. male who presents for a Subsequent Medicare Wellness Visit.    Hypertension: Patient is currently on labetalol 25 mg daily.  He denies any chest pain, dizziness, blurry vision.    HLD: Patient is currently on atorvastatin 20 mg daily.  He reports compliance with his medication.  He has been working on diet and exercise.    HM:  - Patient is due for pneumonia vaccine but declines that today  - He will be getting his flu shot later this year  -Would like a AAA screen, was a smoker in the past but quit a long time ago    The following portions of the patient's history were reviewed and   updated as appropriate: allergies, current medications, past family history, past medical history, past social history, past surgical history, and problem list.    Compared to one year ago, the patient feels his physical   health is better.    Compared to one year ago, the patient feels his mental   health is the same.    Recent Hospitalizations:  He was not admitted to the hospital during the last year.       Current Medical Providers:  Patient Care Team:  Anny Mills MD as PCP - General (Family Medicine)  Forest Bautista MD as Consulting Physician (Cardiology)  Priyanka Marti PA-C as Physician Assistant (Cardiology)    Outpatient Medications Prior to Visit   Medication Sig Dispense Refill    Ascorbic Acid (Vitamin C) 100 MG chewable tablet Vitamin C      aspirin 81 MG EC tablet aspirin 81 mg tablet,delayed release   Take 1 tablet every day by oral route.      atenolol (TENORMIN) 25 MG tablet TAKE ONE TABLET BY MOUTH DAILY 60 tablet 0    atorvastatin (LIPITOR) 20 MG tablet TAKE ONE TABLET BY MOUTH ONCE NIGHTLY 90 tablet 1    Cobalamin Combinations (B-12) 100-5000 MCG sublingual tablet B12      Glucosamine-Chondroit-Vit C-Mn (GLUCOSAMINE 1500 COMPLEX PO) Take 2,000 mg by mouth.       "Methylsulfonylmethane (MSM PO) Take 50 mg by mouth Daily.      Multiple Vitamins-Minerals (MULTIVITAMIN ADULT EXTRA C PO) multivitamin      Red Yeast Rice Extract (RED YEAST RICE PO) Take 1,200 mg by mouth Daily.      Vitamin E 100 UNIT/GM cream vitamin E      FLAXSEED, LINSEED, PO flaxseed (Patient not taking: Reported on 9/21/2023)      Turmeric 500 MG capsule Take 400 mg by mouth Daily. (Patient not taking: Reported on 9/21/2023)       No facility-administered medications prior to visit.       No opioid medication identified on active medication list. I have reviewed chart for other potential  high risk medication/s and harmful drug interactions in the elderly.        Aspirin is on active medication list. Aspirin use is not indicated based on review of current medical condition/s. Risk of harm outweighs potential benefits. Patient instructed to discontinue this medication.  .      Patient Active Problem List   Diagnosis    Essential hypertension    Hyperlipidemia    Diverticular disease of colon    Adenomatous polyp of colon    Annual physical exam    Murmur, cardiac    Obesity (BMI 30-39.9)    Exposure to asbestos    Moderate aortic regurgitation     Advance Care Planning   Advance Care Planning     Advance Directive is not on file.  ACP discussion was held with the patient during this visit. Patient does not have an advance directive, information provided.     Objective    Vitals:    09/21/23 1328   BP: 130/76   Pulse: 55   Temp: 98.4 °F (36.9 °C)   SpO2: 97%   Weight: 108 kg (237 lb 6.4 oz)   Height: 177.8 cm (70\")     Estimated body mass index is 34.06 kg/m² as calculated from the following:    Height as of this encounter: 177.8 cm (70\").    Weight as of this encounter: 108 kg (237 lb 6.4 oz).       Physical Exam  Vitals reviewed.   Constitutional:       Appearance: Normal appearance.   HENT:      Head: Normocephalic.      Right Ear: Tympanic membrane and external ear normal.      Left Ear: Tympanic membrane " and external ear normal.      Nose: Nose normal.      Mouth/Throat:      Mouth: Mucous membranes are moist.   Eyes:      Extraocular Movements: Extraocular movements intact.   Cardiovascular:      Rate and Rhythm: Normal rate and regular rhythm.      Heart sounds: Normal heart sounds.   Pulmonary:      Effort: Pulmonary effort is normal. No respiratory distress.      Breath sounds: Normal breath sounds.   Abdominal:      General: Abdomen is flat.      Palpations: Abdomen is soft.   Musculoskeletal:      Cervical back: No rigidity.      Comments: Moving all extremities spontaneously   Skin:     General: Skin is warm and dry.   Neurological:      General: No focal deficit present.      Mental Status: He is alert and oriented to person, place, and time.      Cranial Nerves: No cranial nerve deficit.   Psychiatric:         Mood and Affect: Mood normal.         Behavior: Behavior normal.       Does the patient have evidence of cognitive impairment? No          HEALTH RISK ASSESSMENT    Smoking Status:  Social History     Tobacco Use   Smoking Status Former    Packs/day: 1.00    Years: 5.00    Pack years: 5.00    Types: Cigarettes    Start date: 1968    Quit date: 1974    Years since quittin.4   Smokeless Tobacco Never     Alcohol Consumption:  Social History     Substance and Sexual Activity   Alcohol Use Not Currently    Comment: A glass once every 4 months.     Fall Risk Screen:    [facility] Fall Risk Assessment was completed, and patient is at LOW risk for falls.Assessment completed on:2023    Depression Screenin/21/2023     1:32 PM   PHQ-2/PHQ-9 Depression Screening   Little Interest or Pleasure in Doing Things 0-->not at all   Feeling Down, Depressed or Hopeless 0-->not at all   PHQ-9: Brief Depression Severity Measure Score 0       Health Habits and Functional and Cognitive Screenin/21/2023     1:32 PM   Functional & Cognitive Status   Do you have difficulty preparing food and  eating? No   Do you have difficulty bathing yourself, getting dressed or grooming yourself? No   Do you have difficulty using the toilet? No   Do you have difficulty moving around from place to place? No   Do you have trouble with steps or getting out of a bed or a chair? No   Current Diet Well Balanced Diet   Dental Exam Up to date   Eye Exam Up to date   Exercise (times per week) 7 times per week   Current Exercises Include Walking   Do you need help using the phone?  No   Are you deaf or do you have serious difficulty hearing?  No   Do you need help to go to places out of walking distance? No   Do you need help shopping? No   Do you need help preparing meals?  No   Do you need help with housework?  No   Do you need help with laundry? No   Do you need help taking your medications? No   Do you need help managing money? No   Do you ever drive or ride in a car without wearing a seat belt? No   Have you felt unusual stress, anger or loneliness in the last month? No   Who do you live with? Spouse   If you need help, do you have trouble finding someone available to you? No   Have you been bothered in the last four weeks by sexual problems? No   Do you have difficulty concentrating, remembering or making decisions? No       Age-appropriate Screening Schedule:  Refer to the list below for future screening recommendations based on patient's age, sex and/or medical conditions. Orders for these recommended tests are listed in the plan section. The patient has been provided with a written plan.    Health Maintenance   Topic Date Due    Pneumococcal Vaccine 65+ (2 - PCV) 10/24/2015    AAA SCREEN (ONE-TIME)  Never done    BMI FOLLOWUP  09/21/2023    ZOSTER VACCINE (1 of 2) 09/21/2023 (Originally 5/4/1998)    COVID-19 Vaccine (1) 09/23/2023 (Originally 1950)    INFLUENZA VACCINE  10/01/2023    LIPID PANEL  03/22/2024    ANNUAL WELLNESS VISIT  09/21/2024    COLORECTAL CANCER SCREENING  11/03/2031    TDAP/TD VACCINES (2 - Td  or Tdap) 09/21/2032    HEPATITIS C SCREENING  Completed                  CMS Preventative Services Quick Reference  Risk Factors Identified During Encounter  Immunizations Discussed/Encouraged: Prevnar 20 (Pneumococcal 20-valent conjugate) and Shingrix  The above risks/problems have been discussed with the patient.  Pertinent information has been shared with the patient in the After Visit Summary.  An After Visit Summary and PPPS were made available to the patient.           Assessment and Plan   Diagnoses and all orders for this visit:    1. Medicare annual wellness visit, subsequent (Primary)  Assessment & Plan:  - Patient is overall doing well and has good support  - He is due for AAA screening, ordered today  - He is up-to-date on his colorectal cancer screening-he does need pneumonia vaccine and shingles vaccine -not desiring pneumonia vaccine because it is [employer], can get shingles vaccine at the pharmacy  -Obtaining screening labs today      2. Prediabetes  Assessment & Plan:  - Patient with hemoglobin A1c in the prediabetic range, had hemoglobin A1c tested today  - Continue diet and exercise, will continue to monitor    Orders:  -     Hemoglobin A1c; Future    3. Hyperlipidemia, unspecified hyperlipidemia type  Assessment & Plan:   - Patient with elevated LDL at last visit, obtaining repeat lipid panel today  - Continue atorvastatin 20 mg daily for now pending labs      Orders:  -     Lipid Panel; Future    4. Hypertension, unspecified type  Assessment & Plan:  - Controlled  - Continue atenolol 25 mg daily  - Obtaining CMP    Orders:  -     Comprehensive Metabolic Panel; Future  -     atenolol (TENORMIN) 25 MG tablet; Take 1 tablet by mouth Daily.  Dispense: 90 tablet; Refill: 1    5. Hx of smoking  Assessment & Plan:  - Patient with a previous history of smoking, obtaining AAA screen today    Orders:  -     Abdominal Aortic Aneurysm Screening Medicare CAR; Future             Follow Up   Return in about 6  months (around 3/21/2024) for follow up.      Patient was given instructions and counseling regarding his condition or for health maintenance advice. Please see specific information pulled into the AVS if appropriate.

## 2023-09-21 NOTE — PATIENT INSTRUCTIONS

## 2023-09-22 ENCOUNTER — TELEPHONE (OUTPATIENT)
Dept: FAMILY MEDICINE CLINIC | Facility: CLINIC | Age: 73
End: 2023-09-22
Payer: MEDICARE

## 2023-09-22 PROBLEM — R73.03 PREDIABETES: Status: ACTIVE | Noted: 2023-09-22

## 2023-09-22 PROBLEM — Z87.891 HX OF SMOKING: Status: ACTIVE | Noted: 2023-09-22

## 2023-09-22 PROBLEM — Z00.00 MEDICARE ANNUAL WELLNESS VISIT, SUBSEQUENT: Status: ACTIVE | Noted: 2023-09-22

## 2023-09-22 PROBLEM — I10 HYPERTENSION: Status: ACTIVE | Noted: 2023-09-22

## 2023-09-22 LAB
ALBUMIN SERPL-MCNC: 4.6 G/DL (ref 3.5–5.2)
ALBUMIN/GLOB SERPL: 1.3 G/DL
ALP SERPL-CCNC: 77 U/L (ref 39–117)
ALT SERPL W P-5'-P-CCNC: 29 U/L (ref 1–41)
ANION GAP SERPL CALCULATED.3IONS-SCNC: 13.4 MMOL/L (ref 5–15)
AST SERPL-CCNC: 39 U/L (ref 1–40)
BILIRUB SERPL-MCNC: 0.4 MG/DL (ref 0–1.2)
BUN SERPL-MCNC: 17 MG/DL (ref 8–23)
BUN/CREAT SERPL: 16 (ref 7–25)
CALCIUM SPEC-SCNC: 9.9 MG/DL (ref 8.6–10.5)
CHLORIDE SERPL-SCNC: 98 MMOL/L (ref 98–107)
CHOLEST SERPL-MCNC: 167 MG/DL (ref 0–200)
CO2 SERPL-SCNC: 22.6 MMOL/L (ref 22–29)
CREAT SERPL-MCNC: 1.06 MG/DL (ref 0.76–1.27)
EGFRCR SERPLBLD CKD-EPI 2021: 73.2 ML/MIN/1.73
GLOBULIN UR ELPH-MCNC: 3.5 GM/DL
GLUCOSE SERPL-MCNC: 88 MG/DL (ref 65–99)
HBA1C MFR BLD: 5.8 % (ref 4.8–5.6)
HDLC SERPL-MCNC: 39 MG/DL (ref 40–60)
LDLC SERPL CALC-MCNC: 99 MG/DL (ref 0–100)
LDLC/HDLC SERPL: 2.45 {RATIO}
POTASSIUM SERPL-SCNC: 4.6 MMOL/L (ref 3.5–5.2)
PROT SERPL-MCNC: 8.1 G/DL (ref 6–8.5)
SODIUM SERPL-SCNC: 134 MMOL/L (ref 136–145)
TRIGL SERPL-MCNC: 163 MG/DL (ref 0–150)
VLDLC SERPL-MCNC: 29 MG/DL (ref 5–40)

## 2023-09-22 NOTE — TELEPHONE ENCOUNTER
"Okay for the Hub to read:       \"Your kidney function, liver function, and electrolytes are all within normal limits.  Your cholesterol looks good as well.  Your LDL is less than 100, which is at goal.  I would continue the cholesterol-lowering medicine.  You can stop the aspirin at this time.  Your hemoglobin A1c is 5.8, which is in the prediabetic range.  This does not mean that you have diabetes but that you are at increased risk of developing diabetes.  Working on diet and exercise can help keep this number down.  We will continue to monitor this annually.  Please let me know if you have any further questions or concerns.\"   "

## 2023-09-22 NOTE — ASSESSMENT & PLAN NOTE
- Patient with hemoglobin A1c in the prediabetic range, had hemoglobin A1c tested today  - Continue diet and exercise, will continue to monitor

## 2023-09-22 NOTE — ASSESSMENT & PLAN NOTE
- Patient is overall doing well and has good support  - He is due for AAA screening, ordered today  - He is up-to-date on his colorectal cancer screening-he does need pneumonia vaccine and shingles vaccine -not desiring pneumonia vaccine because it is [email or web address], can get shingles vaccine at the pharmacy  -Obtaining screening labs today

## 2023-10-25 ENCOUNTER — EXTERNAL PBMM DATA (OUTPATIENT)
Dept: PHARMACY | Facility: OTHER | Age: 73
End: 2023-10-25
Payer: MEDICARE

## 2023-11-06 ENCOUNTER — POP HEALTH PHARMACY (OUTPATIENT)
Dept: PHARMACY | Facility: OTHER | Age: 73
End: 2023-11-06
Payer: MEDICARE

## 2023-11-06 NOTE — PROGRESS NOTES
[facility] Outreach      Juan Hebert was called today to discuss medication adherence with ATORVASTATIN CALCIUM ([facility] [employer] REDUCTASE INHIBITORS) , as he was identified as having care opportunities.    Program Details    [facility]  Status: Enrolled  Effective Dates: 11/4/2023 - present  Responsible Staff: Lacy Blankenship Identified    Adherence- Cholesterol       Adherence and Medication Management        General Medication Management    Type of medication management: targeted medication review  Referred by: insurance group  Recipient: beneficiary  Provider: pharmacist - other  Visit type: initial  Method of contact: by telephone                             Medication Therapy Problems     Medication Therapy Recommendations  No medication therapy recommendations to display      Summary  Mr. Hebert was unclear why his pharmacy had not filled his medication, I offered to outreach and check on it for him. The pharmacy stated it was being filled at their central filling center so there was a slight delay, should be available for pickup tomorrow. No other issues.    Medication Management Summary    Topics discussed: reminder to refill or  medication discussed         Lacy Blankenship, 11/06/23, 2:52 PM EST.

## 2023-11-16 DIAGNOSIS — I10 HYPERTENSION, UNSPECIFIED TYPE: ICD-10-CM

## 2023-11-16 RX ORDER — ATENOLOL 25 MG/1
25 TABLET ORAL DAILY
Qty: 60 TABLET | Refills: 2 | Status: SHIPPED | OUTPATIENT
Start: 2023-11-16

## 2023-11-21 ENCOUNTER — HOSPITAL ENCOUNTER (OUTPATIENT)
Dept: ULTRASOUND IMAGING | Facility: HOSPITAL | Age: 73
Discharge: HOME | End: 2023-11-21
Admitting: STUDENT IN AN ORGANIZED HEALTH CARE EDUCATION/TRAINING PROGRAM
Payer: MEDICARE

## 2023-11-21 DIAGNOSIS — Z87.891 HX OF SMOKING: ICD-10-CM

## 2023-11-21 PROCEDURE — 76706 US ABDL AORTA SCREEN AAA: CPT

## 2023-11-27 ENCOUNTER — FLU SHOT (OUTPATIENT)
Dept: FAMILY MEDICINE CLINIC | Facility: CLINIC | Age: 73
End: 2023-11-27
Payer: MEDICARE

## 2023-11-27 ENCOUNTER — POP HEALTH PHARMACY (OUTPATIENT)
Dept: PHARMACY | Facility: OTHER | Age: 73
End: 2023-11-27
Payer: MEDICARE

## 2023-11-27 DIAGNOSIS — Z23 IMMUNIZATION DUE: Primary | ICD-10-CM

## 2023-11-27 PROCEDURE — G0008 ADMIN INFLUENZA VIRUS VAC: HCPCS | Performed by: STUDENT IN AN ORGANIZED HEALTH CARE EDUCATION/TRAINING PROGRAM

## 2023-11-27 PROCEDURE — 90662 IIV NO PRSV INCREASED AG IM: CPT | Performed by: STUDENT IN AN ORGANIZED HEALTH CARE EDUCATION/TRAINING PROGRAM

## 2023-12-06 ENCOUNTER — HOSPITAL ENCOUNTER (OUTPATIENT)
Dept: CARDIOLOGY | Facility: HOSPITAL | Age: 73
Discharge: HOME | End: 2023-12-06
Admitting: PHYSICIAN ASSISTANT
Payer: MEDICARE

## 2023-12-06 DIAGNOSIS — I35.1 MODERATE AORTIC REGURGITATION: ICD-10-CM

## 2023-12-06 DIAGNOSIS — I10 ESSENTIAL HYPERTENSION: ICD-10-CM

## 2023-12-06 PROCEDURE — 93306 TTE W/DOPPLER COMPLETE: CPT

## 2023-12-07 LAB
BH CV ECHO MEAS - AI P1/2T: 316.2 MSEC
BH CV ECHO MEAS - AO MAX PG: 7.2 MMHG
BH CV ECHO MEAS - AO MEAN PG: 4 MMHG
BH CV ECHO MEAS - AO ROOT DIAM: 3.6 CM
BH CV ECHO MEAS - AO V2 MAX: 134 CM/SEC
BH CV ECHO MEAS - AO V2 VTI: 32.2 CM
BH CV ECHO MEAS - AVA(I,D): 3 CM2
BH CV ECHO MEAS - EDV(CUBED): 185.2 ML
BH CV ECHO MEAS - EDV(MOD-SP2): 179 ML
BH CV ECHO MEAS - EDV(MOD-SP4): 208 ML
BH CV ECHO MEAS - EF(MOD-BP): 60.4 %
BH CV ECHO MEAS - EF(MOD-SP2): 59.3 %
BH CV ECHO MEAS - EF(MOD-SP4): 61.5 %
BH CV ECHO MEAS - ESV(CUBED): 39.5 ML
BH CV ECHO MEAS - ESV(MOD-SP2): 72.9 ML
BH CV ECHO MEAS - ESV(MOD-SP4): 80 ML
BH CV ECHO MEAS - FS: 40.3 %
BH CV ECHO MEAS - IVS/LVPW: 1.1 CM
BH CV ECHO MEAS - IVSD: 1.1 CM
BH CV ECHO MEAS - LA DIMENSION: 4.9 CM
BH CV ECHO MEAS - LAT PEAK E' VEL: 7.5 CM/SEC
BH CV ECHO MEAS - LV MASS(C)D: 241.3 GRAMS
BH CV ECHO MEAS - LV MAX PG: 6.4 MMHG
BH CV ECHO MEAS - LV MEAN PG: 3 MMHG
BH CV ECHO MEAS - LV V1 MAX: 126 CM/SEC
BH CV ECHO MEAS - LV V1 VTI: 29.7 CM
BH CV ECHO MEAS - LVIDD: 5.7 CM
BH CV ECHO MEAS - LVIDS: 3.4 CM
BH CV ECHO MEAS - LVOT AREA: 3.3 CM2
BH CV ECHO MEAS - LVOT DIAM: 2.04 CM
BH CV ECHO MEAS - LVPWD: 1 CM
BH CV ECHO MEAS - MED PEAK E' VEL: 4.9 CM/SEC
BH CV ECHO MEAS - MV A MAX VEL: 63.8 CM/SEC
BH CV ECHO MEAS - MV DEC SLOPE: 367.7 CM/SEC2
BH CV ECHO MEAS - MV DEC TIME: 0.3 SEC
BH CV ECHO MEAS - MV E MAX VEL: 59.6 CM/SEC
BH CV ECHO MEAS - MV E/A: 0.93
BH CV ECHO MEAS - MV P1/2T: 71.4 MSEC
BH CV ECHO MEAS - MVA(P1/2T): 3.1 CM2
BH CV ECHO MEAS - PA ACC TIME: 0.13 SEC
BH CV ECHO MEAS - PA V2 MAX: 67.9 CM/SEC
BH CV ECHO MEAS - RAP SYSTOLE: 3 MMHG
BH CV ECHO MEAS - RVSP: 25 MMHG
BH CV ECHO MEAS - SV(LVOT): 97.4 ML
BH CV ECHO MEAS - SV(MOD-SP2): 106.1 ML
BH CV ECHO MEAS - SV(MOD-SP4): 128 ML
BH CV ECHO MEAS - TAPSE (>1.6): 2.24 CM
BH CV ECHO MEAS - TR MAX PG: 22.2 MMHG
BH CV ECHO MEAS - TR MAX VEL: 235.2 CM/SEC
BH CV ECHO MEASUREMENTS AVERAGE E/E' RATIO: 9.61
BH CV XLRA - TDI S': 9.7 CM/SEC
LEFT ATRIUM VOLUME INDEX: 29.5 ML/M2

## 2024-02-22 NOTE — PROGRESS NOTES
OFFICE VISIT  NOTE  [facility] CARDIOLOGY      Name: Juan Hebert    Date: 2024  MRN:  [id number]  :  1950      REFERRING/PRIMARY PROVIDER:  Anny Mills MD     Chief Complaint   Patient presents with    Essential hypertension       HPI: Juan Hebert is a 73 y.o. male who presents today for follow up of  aortic regurgitation.  Moved from [place] to [place] 10/2022.  Followed with a cardiologist Dr. MONTGOMERY in [place] since  for moderate aortic regurgitation.  On one echo in  it was called moderate to severe but most recent echo 10/2021 it was called moderate with EF of 60%.  He had a stress echo that was negative for ischemia 10/2021 in [place].    Doing well overall still walks about 2 miles per day with his dogs, no significant shortness of breath or chest pain.  Most recent echo 2023 was stable with moderate AI.  And normal cardiac dimensions.    ROS:Pertinent positives as listed in the HPI.  All other systems reviewed and negative.    Past Medical History:   Diagnosis Date    Allergic     Arthritis 6 yrs. Ago    Hyperlipidemia     Hypertension 20 years ago    Obesity 40 years ago    Visual impairment All my life       History reviewed. No pertinent surgical history.    Social History     Socioeconomic History    Marital status:    Tobacco Use    Smoking status: Former     Packs/day: 1.00     Years: 5.00     Additional pack years: 0.00     Total pack years: 5.00     Types: Cigarettes     Start date: 1968     Quit date: 1974     Years since quittin.8    Smokeless tobacco: Never   Vaping Use    Vaping Use: Never used    Passive vaping exposure: Yes   Substance and Sexual Activity    Alcohol use: Not Currently     Comment: A glass once every 4 months.    Drug use: Never    Sexual activity: Yes     Partners: Female       Family History   Problem Relation Age of Onset    Cancer Mother     Heart disease Father         Heart attack at age 62  "   Heart disease Brother     Heart disease Sister         No Known Allergies    Current Outpatient Medications   Medication Instructions    Ascorbic Acid (Vitamin C) 100 MG chewable tablet Vitamin C    atenolol (TENORMIN) 25 mg, Oral, Daily    atorvastatin (LIPITOR) 20 MG tablet TAKE ONE TABLET BY MOUTH ONCE NIGHTLY    CITRUS BERGAMOT PO 1,000 mg, Oral, Daily    Cobalamin Combinations (B-12) 100-5000 MCG sublingual tablet B12    Glucosamine-Chondroit-Vit C-Mn (GLUCOSAMINE 1500 COMPLEX PO) 2,000 mg, Oral    Methylsulfonylmethane (MSM PO) 50 mg, Oral, Daily    Multiple Vitamins-Minerals (MULTIVITAMIN ADULT EXTRA C PO) multivitamin    Vitamin E 100 UNIT/GM cream vitamin E       Vitals:    02/28/24 0909   BP: 132/78   BP Location: Right arm   Patient Position: Sitting   Pulse: 65   SpO2: 95%   Weight: 111 kg (245 lb)   Height: 177.8 cm (70\")     Body mass index is 35.15 kg/m².    PHYSICAL EXAM:    General Appearance:   well developed  well nourished  Neck:  thyroid not enlarged  supple  Respiratory:  no respiratory distress  normal breath sounds  no rales  Cardiovascular:  no jugular venous distention  regular rhythm  apical impulse normal  S1 normal, S2 normal  no S3, no S4   no murmur  no rub, no thrill  carotid pulses normal; no bruit  pedal pulses normal  lower extremity edema: none    Skin:   warm, dry    RESULTS:   Procedures    Results for orders placed during the hospital encounter of 12/06/23    Adult Transthoracic Echo Complete w/ Color, Spectral and Contrast if necessary per protocol    Interpretation Summary    Left ventricular systolic function is normal. Calculated left ventricular EF = 60.4%    The left atrial cavity is dilated.    There is moderate calcification of the aortic valve.    Moderate aortic valve regurgitation is present.    Estimated right ventricular systolic pressure from tricuspid regurgitation is normal (<35 mmHg).    No significant change compared to 12/2022        Labs:  Lab Results " "  Component Value Date    CHOL 167 09/21/2023    TRIG 163 (H) 09/21/2023    HDL 39 (L) 09/21/2023    LDL 99 09/21/2023    AST 39 09/21/2023    ALT 29 09/21/2023     Lab Results   Component Value Date    HGBA1C 5.80 (H) 09/21/2023     Creatinine   Date Value Ref Range Status   09/21/2023 1.06 0.76 - 1.27 mg/dL Final   09/21/2022 1.06 0.76 - 1.27 mg/dL Final     No results found for: \"EGFRIFNONA\"      ASSESSMENT:  Problem List Items Addressed This Visit       Essential hypertension    Hyperlipidemia    Murmur, cardiac    Moderate aortic regurgitation - Primary       PLAN:    1.  Moderate aortic regurgitation:  Echocardiogram 12/2022 with normal EF, moderate AI  Repeat echo 12/2023 with normal EF, moderate AI, stable compared to prior echo     Repeat echo in 1 year at follow-up visit     2.  Hypertension:  Long-term goal blood pressure less than 130/80  Well-controlled on current regimen.     3.  Hyperlipidemia:  Goal LDL less than 100  On Atorvastatin 20mg   Recent LDL 99, continue Lipitor and diet modifications      Agree with no need for daily aspirin for primary          Advance Care Planning   ACP discussion was held with the patient during this visit. Patient has an advance directive (not in EMR), copy requested.          Follow-up   Return in about 1 year (around 2/28/2025).    Forest Bautista MD, FACC, [facility]  Interventional Cardiology    "

## 2024-02-28 ENCOUNTER — OFFICE VISIT (OUTPATIENT)
Dept: CARDIOLOGY | Facility: CLINIC | Age: 74
End: 2024-02-28
Payer: MEDICARE

## 2024-02-28 ENCOUNTER — PATIENT ROUNDING (BHMG ONLY) (OUTPATIENT)
Dept: CARDIOLOGY | Facility: CLINIC | Age: 74
End: 2024-02-28
Payer: MEDICARE

## 2024-02-28 VITALS
HEART RATE: 65 BPM | BODY MASS INDEX: 35.07 KG/M2 | SYSTOLIC BLOOD PRESSURE: 132 MMHG | OXYGEN SATURATION: 95 % | HEIGHT: 70 IN | WEIGHT: 245 LBS | DIASTOLIC BLOOD PRESSURE: 78 MMHG

## 2024-02-28 DIAGNOSIS — R01.1 MURMUR, CARDIAC: ICD-10-CM

## 2024-02-28 DIAGNOSIS — I35.1 MODERATE AORTIC REGURGITATION: Primary | ICD-10-CM

## 2024-02-28 DIAGNOSIS — I10 ESSENTIAL HYPERTENSION: ICD-10-CM

## 2024-02-28 DIAGNOSIS — E78.2 MIXED HYPERLIPIDEMIA: ICD-10-CM

## 2024-02-28 PROCEDURE — 3075F SYST BP GE 130 - 139MM HG: CPT | Performed by: INTERNAL MEDICINE

## 2024-02-28 PROCEDURE — 99214 OFFICE O/P EST MOD 30 MIN: CPT | Performed by: INTERNAL MEDICINE

## 2024-02-28 PROCEDURE — 3078F DIAST BP <80 MM HG: CPT | Performed by: INTERNAL MEDICINE

## 2024-02-28 NOTE — PROGRESS NOTES
February 28, 2024    Hello, may I speak with Juan Hebert?    My name is Beth      I am  with [facility] CARDIOLOGY  [address]  [facility] [address] [phone].    Before we get started may I verify your date of birth? 1950      I am calling to  welcome you to our practice and ask about your recent visit.         Tell me about your visit with us. What things went well?  everything       We're always looking for ways to make our patients' experiences even better. Do you have recommendations on ways we may improve?  the check in process and mail out to have more direct directions    Overall were you satisfied with your visit to our practice? yes       I appreciate you taking the time to speaking with me today. Is there anything else I can do for you? no      Thank you, and have a great day.

## 2024-03-28 ENCOUNTER — LAB (OUTPATIENT)
Dept: LAB | Facility: HOSPITAL | Age: 74
End: 2024-03-28
Payer: MEDICARE

## 2024-03-28 ENCOUNTER — OFFICE VISIT (OUTPATIENT)
Dept: FAMILY MEDICINE CLINIC | Facility: CLINIC | Age: 74
End: 2024-03-28
Payer: MEDICARE

## 2024-03-28 VITALS
DIASTOLIC BLOOD PRESSURE: 64 MMHG | HEIGHT: 70 IN | WEIGHT: 248 LBS | SYSTOLIC BLOOD PRESSURE: 126 MMHG | TEMPERATURE: 96.8 F | OXYGEN SATURATION: 98 % | BODY MASS INDEX: 35.5 KG/M2 | HEART RATE: 74 BPM

## 2024-03-28 DIAGNOSIS — I10 HYPERTENSION, UNSPECIFIED TYPE: Primary | ICD-10-CM

## 2024-03-28 DIAGNOSIS — I10 HYPERTENSION, UNSPECIFIED TYPE: ICD-10-CM

## 2024-03-28 DIAGNOSIS — E78.5 HYPERLIPIDEMIA, UNSPECIFIED HYPERLIPIDEMIA TYPE: ICD-10-CM

## 2024-03-28 DIAGNOSIS — R22.1 MASS OF RIGHT SIDE OF NECK: ICD-10-CM

## 2024-03-28 LAB
ALBUMIN SERPL-MCNC: 4.6 G/DL (ref 3.5–5.2)
ALBUMIN/GLOB SERPL: 1.5 G/DL
ALP SERPL-CCNC: 77 U/L (ref 39–117)
ALT SERPL W P-5'-P-CCNC: 38 U/L (ref 1–41)
ANION GAP SERPL CALCULATED.3IONS-SCNC: 12 MMOL/L (ref 5–15)
AST SERPL-CCNC: 33 U/L (ref 1–40)
BASOPHILS # BLD AUTO: 0.09 10*3/MM3 (ref 0–0.2)
BASOPHILS NFR BLD AUTO: 1.1 % (ref 0–1.5)
BILIRUB SERPL-MCNC: 0.4 MG/DL (ref 0–1.2)
BUN SERPL-MCNC: 18 MG/DL (ref 8–23)
BUN/CREAT SERPL: 14.6 (ref 7–25)
CALCIUM SPEC-SCNC: 9.5 MG/DL (ref 8.6–10.5)
CHLORIDE SERPL-SCNC: 107 MMOL/L (ref 98–107)
CO2 SERPL-SCNC: 23 MMOL/L (ref 22–29)
CREAT SERPL-MCNC: 1.23 MG/DL (ref 0.76–1.27)
DEPRECATED RDW RBC AUTO: 46.7 FL (ref 37–54)
EGFRCR SERPLBLD CKD-EPI 2021: 61.2 ML/MIN/1.73
EOSINOPHIL # BLD AUTO: 0.43 10*3/MM3 (ref 0–0.4)
EOSINOPHIL NFR BLD AUTO: 5.2 % (ref 0.3–6.2)
ERYTHROCYTE [DISTWIDTH] IN BLOOD BY AUTOMATED COUNT: 13.1 % (ref 12.3–15.4)
GLOBULIN UR ELPH-MCNC: 3 GM/DL
GLUCOSE SERPL-MCNC: 102 MG/DL (ref 65–99)
HCT VFR BLD AUTO: 45.2 % (ref 37.5–51)
HGB BLD-MCNC: 15.4 G/DL (ref 13–17.7)
IMM GRANULOCYTES # BLD AUTO: 0.05 10*3/MM3 (ref 0–0.05)
IMM GRANULOCYTES NFR BLD AUTO: 0.6 % (ref 0–0.5)
LYMPHOCYTES # BLD AUTO: 3.32 10*3/MM3 (ref 0.7–3.1)
LYMPHOCYTES NFR BLD AUTO: 40.1 % (ref 19.6–45.3)
MCH RBC QN AUTO: 32.8 PG (ref 26.6–33)
MCHC RBC AUTO-ENTMCNC: 34.1 G/DL (ref 31.5–35.7)
MCV RBC AUTO: 96.2 FL (ref 79–97)
MONOCYTES # BLD AUTO: 0.8 10*3/MM3 (ref 0.1–0.9)
MONOCYTES NFR BLD AUTO: 9.7 % (ref 5–12)
NEUTROPHILS NFR BLD AUTO: 3.58 10*3/MM3 (ref 1.7–7)
NEUTROPHILS NFR BLD AUTO: 43.3 % (ref 42.7–76)
NRBC BLD AUTO-RTO: 0 /100 WBC (ref 0–0.2)
PLATELET # BLD AUTO: 272 10*3/MM3 (ref 140–450)
PMV BLD AUTO: 9.5 FL (ref 6–12)
POTASSIUM SERPL-SCNC: 5.1 MMOL/L (ref 3.5–5.2)
PROT SERPL-MCNC: 7.6 G/DL (ref 6–8.5)
RBC # BLD AUTO: 4.7 10*6/MM3 (ref 4.14–5.8)
SODIUM SERPL-SCNC: 142 MMOL/L (ref 136–145)
WBC NRBC COR # BLD AUTO: 8.27 10*3/MM3 (ref 3.4–10.8)

## 2024-03-28 PROCEDURE — 80053 COMPREHEN METABOLIC PANEL: CPT

## 2024-03-28 PROCEDURE — 99214 OFFICE O/P EST MOD 30 MIN: CPT | Performed by: STUDENT IN AN ORGANIZED HEALTH CARE EDUCATION/TRAINING PROGRAM

## 2024-03-28 PROCEDURE — 1160F RVW MEDS BY RX/DR IN RCRD: CPT | Performed by: STUDENT IN AN ORGANIZED HEALTH CARE EDUCATION/TRAINING PROGRAM

## 2024-03-28 PROCEDURE — 1159F MED LIST DOCD IN RCRD: CPT | Performed by: STUDENT IN AN ORGANIZED HEALTH CARE EDUCATION/TRAINING PROGRAM

## 2024-03-28 PROCEDURE — 3074F SYST BP LT 130 MM HG: CPT | Performed by: STUDENT IN AN ORGANIZED HEALTH CARE EDUCATION/TRAINING PROGRAM

## 2024-03-28 PROCEDURE — 85025 COMPLETE CBC W/AUTO DIFF WBC: CPT

## 2024-03-28 PROCEDURE — 3078F DIAST BP <80 MM HG: CPT | Performed by: STUDENT IN AN ORGANIZED HEALTH CARE EDUCATION/TRAINING PROGRAM

## 2024-03-28 RX ORDER — ASPIRIN 81 MG/1
1 TABLET ORAL DAILY
COMMUNITY

## 2024-03-28 RX ORDER — ATENOLOL 25 MG/1
25 TABLET ORAL DAILY
Qty: 90 TABLET | Refills: 1 | Status: SHIPPED | OUTPATIENT
Start: 2024-03-28

## 2024-03-28 RX ORDER — ATORVASTATIN CALCIUM 20 MG/1
20 TABLET, FILM COATED ORAL NIGHTLY
Qty: 90 TABLET | Refills: 1 | Status: SHIPPED | OUTPATIENT
Start: 2024-03-28

## 2024-03-28 NOTE — Clinical Note
March 28, 2024     Patient: Juan Hebert   YOB: 1950   Date of Visit: 3/28/2024       To Whom It May Concern:    It is my medical opinion that Juan Hebert may return to work in two days.         Sincerely,        Anny Mills MD    CC: No Recipients

## 2024-03-28 NOTE — ASSESSMENT & PLAN NOTE
- Patient with masslike lesion on the right side of the neck that is nontender to palpation, likely lymph node versus cyst  - Obtaining CBC  - Obtaining ultrasound, will follow-up on results and advise further

## 2024-03-28 NOTE — PROGRESS NOTES
"    Office Note     Name: Juan Hebert    : 1950     MRN: [id number]     Chief Complaint  Hypertension and Cyst (In the collar of his neck)    Subjective     History of Present Illness:  Juan Hebert is a 73 y.o. male who presents today for hypertension, hyperlipidemia and cyst on color of his neck    Hypertension: Patient is currently on atenolol 25 mg daily.  He reports compliance with this medicine.  Denies any chest pain, dizziness, blurry vision.    HLD: Patient is currently on atorvastatin 20 mg daily. He reports compliance with his medication. He has been working on diet and exercise.     Patient reports that he had a cyst lanced on the right portion of his neck about 20 years ago.  About a week ago, he noticed that there was some swelling in that area and he had pain.  It is no longer bothering him, but he still has a lesion in that area.        Objective     Past Medical History:   Diagnosis Date    Allergic     Arthritis 6 yrs. Ago    Hyperlipidemia     Hypertension 20 years ago    Obesity 40 years ago    Visual impairment All my life     No past surgical history on file.  Family History   Problem Relation Age of Onset    Cancer Mother     Heart disease Father         Heart attack at age 62    Heart disease Brother     Heart disease Sister        Vital Signs  /64   Pulse 74   Temp 96.8 °F (36 °C) (Infrared)   Ht 177.8 cm (70\")   Wt 112 kg (248 lb)   SpO2 98%   BMI 35.58 kg/m²   Estimated body mass index is 35.58 kg/m² as calculated from the following:    Height as of this encounter: 177.8 cm (70\").    Weight as of this encounter: 112 kg (248 lb).    Physical Exam  Vitals reviewed.   Constitutional:       Appearance: Normal appearance.   HENT:      Head: Normocephalic and atraumatic.      Right Ear: Tympanic membrane normal.      Left Ear: Tympanic membrane normal.      Nose: Nose normal.      Mouth/Throat:      Mouth: Mucous membranes are moist.   Eyes:      Conjunctiva/sclera: " Conjunctivae normal.   Neck:      Comments: Masslike lesion at base of right side of neck  Cardiovascular:      Rate and Rhythm: Normal rate and regular rhythm.      Heart sounds: Murmur heard.   Pulmonary:      Effort: Pulmonary effort is normal.      Breath sounds: Normal breath sounds.   Abdominal:      General: Abdomen is flat.      Palpations: Abdomen is soft.   Neurological:      Mental Status: He is alert.                   Assessment and Plan     Diagnoses and all orders for this visit:    1. Hypertension, unspecified type (Primary)  Assessment & Plan:  - Controlled  - Continue atenolol 25 mg daily  - Obtaining CMP    Orders:  -     atenolol (TENORMIN) 25 MG tablet; Take 1 tablet by mouth Daily.  Dispense: 90 tablet; Refill: 1  -     Comprehensive Metabolic Panel; Future  -     CBC & Differential; Future    2. Hyperlipidemia, unspecified hyperlipidemia type  Assessment & Plan:  - Last LDL within normal limits, no lipid panel indicated at this time  - Continue atorvastatin 20 mg daily      Orders:  -     atorvastatin (LIPITOR) 20 MG tablet; Take 1 tablet by mouth Every Night.  Dispense: 90 tablet; Refill: 1    3. Mass of right side of neck  Assessment & Plan:  - Patient with masslike lesion on the right side of the neck that is nontender to palpation, likely lymph node versus cyst  - Obtaining CBC  - Obtaining ultrasound, will follow-up on results and advise further    Orders:  -     US Head Neck Soft Tissue; Future      Class 2 Severe Obesity (BMI >=35 and <=39.9). Obesity-related health conditions include the following: hypertension and dyslipidemias. Obesity is unchanged. BMI is is above average; BMI management plan is completed. We discussed portion control and increasing exercise.      Follow Up  Return in about 6 months (around 9/28/2024) for Medicare Wellness.    Anny Mills MD

## 2024-03-28 NOTE — ASSESSMENT & PLAN NOTE
- Last LDL within normal limits, no lipid panel indicated at this time  - Continue atorvastatin 20 mg daily

## 2024-05-10 ENCOUNTER — HOSPITAL ENCOUNTER (OUTPATIENT)
Dept: ULTRASOUND IMAGING | Facility: HOSPITAL | Age: 74
Discharge: HOME | End: 2024-05-10
Payer: MEDICARE

## 2024-05-10 DIAGNOSIS — R22.1 MASS OF RIGHT SIDE OF NECK: ICD-10-CM

## 2024-05-10 PROCEDURE — 76536 US EXAM OF HEAD AND NECK: CPT

## 2024-08-09 ENCOUNTER — OFFICE VISIT (OUTPATIENT)
Dept: FAMILY MEDICINE CLINIC | Facility: CLINIC | Age: 74
End: 2024-08-09
Payer: MEDICARE

## 2024-08-09 ENCOUNTER — LAB (OUTPATIENT)
Dept: LAB | Facility: HOSPITAL | Age: 74
End: 2024-08-09
Payer: MEDICARE

## 2024-08-09 VITALS
HEART RATE: 67 BPM | TEMPERATURE: 97.8 F | WEIGHT: 256 LBS | DIASTOLIC BLOOD PRESSURE: 68 MMHG | SYSTOLIC BLOOD PRESSURE: 134 MMHG | BODY MASS INDEX: 36.65 KG/M2 | HEIGHT: 70 IN | OXYGEN SATURATION: 95 %

## 2024-08-09 DIAGNOSIS — R42 DIZZINESS: Primary | ICD-10-CM

## 2024-08-09 DIAGNOSIS — R42 DIZZINESS: ICD-10-CM

## 2024-08-09 LAB
ANION GAP SERPL CALCULATED.3IONS-SCNC: 12.7 MMOL/L (ref 5–15)
BUN SERPL-MCNC: 19 MG/DL (ref 8–23)
BUN/CREAT SERPL: 15.3 (ref 7–25)
CALCIUM SPEC-SCNC: 10.2 MG/DL (ref 8.6–10.5)
CHLORIDE SERPL-SCNC: 101 MMOL/L (ref 98–107)
CO2 SERPL-SCNC: 22.3 MMOL/L (ref 22–29)
CREAT SERPL-MCNC: 1.24 MG/DL (ref 0.76–1.27)
DEPRECATED RDW RBC AUTO: 44.1 FL (ref 37–54)
EGFRCR SERPLBLD CKD-EPI 2021: 60.3 ML/MIN/1.73
ERYTHROCYTE [DISTWIDTH] IN BLOOD BY AUTOMATED COUNT: 12.7 % (ref 12.3–15.4)
GLUCOSE SERPL-MCNC: 82 MG/DL (ref 65–99)
HCT VFR BLD AUTO: 44.5 % (ref 37.5–51)
HGB BLD-MCNC: 15.3 G/DL (ref 13–17.7)
MCH RBC QN AUTO: 32.7 PG (ref 26.6–33)
MCHC RBC AUTO-ENTMCNC: 34.4 G/DL (ref 31.5–35.7)
MCV RBC AUTO: 95.1 FL (ref 79–97)
PLATELET # BLD AUTO: 271 10*3/MM3 (ref 140–450)
PMV BLD AUTO: 9.8 FL (ref 6–12)
POTASSIUM SERPL-SCNC: 4.9 MMOL/L (ref 3.5–5.2)
RBC # BLD AUTO: 4.68 10*6/MM3 (ref 4.14–5.8)
SODIUM SERPL-SCNC: 136 MMOL/L (ref 136–145)
WBC NRBC COR # BLD AUTO: 9.29 10*3/MM3 (ref 3.4–10.8)

## 2024-08-09 PROCEDURE — 99214 OFFICE O/P EST MOD 30 MIN: CPT | Performed by: PHYSICIAN ASSISTANT

## 2024-08-09 PROCEDURE — 80048 BASIC METABOLIC PNL TOTAL CA: CPT

## 2024-08-09 PROCEDURE — 1126F AMNT PAIN NOTED NONE PRSNT: CPT | Performed by: PHYSICIAN ASSISTANT

## 2024-08-09 PROCEDURE — 3078F DIAST BP <80 MM HG: CPT | Performed by: PHYSICIAN ASSISTANT

## 2024-08-09 PROCEDURE — 3075F SYST BP GE 130 - 139MM HG: CPT | Performed by: PHYSICIAN ASSISTANT

## 2024-08-09 PROCEDURE — 82607 VITAMIN B-12: CPT

## 2024-08-09 PROCEDURE — 1159F MED LIST DOCD IN RCRD: CPT | Performed by: PHYSICIAN ASSISTANT

## 2024-08-09 PROCEDURE — 36415 COLL VENOUS BLD VENIPUNCTURE: CPT

## 2024-08-09 PROCEDURE — 85027 COMPLETE CBC AUTOMATED: CPT

## 2024-08-09 PROCEDURE — 1160F RVW MEDS BY RX/DR IN RCRD: CPT | Performed by: PHYSICIAN ASSISTANT

## 2024-08-09 RX ORDER — BENZOCAINE 20 %
GEL (GRAM) MUCOUS MEMBRANE
COMMUNITY
End: 2024-08-09

## 2024-08-09 RX ORDER — AMOXICILLIN 250 MG
CAPSULE ORAL
COMMUNITY

## 2024-08-09 RX ORDER — FLUTICASONE PROPIONATE 50 MCG
2 SPRAY, SUSPENSION (ML) NASAL DAILY
Qty: 18.2 ML | Refills: 1 | Status: SHIPPED | OUTPATIENT
Start: 2024-08-09

## 2024-08-09 RX ORDER — RIBOFLAVIN (VITAMIN B2) 100 MG
TABLET ORAL
COMMUNITY

## 2024-08-09 NOTE — PROGRESS NOTES
Follow Up Office Visit    Date: 2024   Patient Name: Juan Hebert  : 1950   MRN: [id number]     Chief Complaint:    Chief Complaint   Patient presents with    Dizziness     Started Monday   Fell of roller and was dizzy from that  When waking up in the morning he feels dizzy  Feels unsteady        History of Present Illness:   Juan Hebert is a 74 y.o. male.  History of Present Illness  The patient presents for evaluation of dizziness. He is accompanied by an adult female.    He began experiencing dizziness unexpectedly on Monday. Initially, he was using a black roller for stretching due to back pain. However, after discontinuing the roller, he was unable to move or get up for 15 to 20 minutes. Accompanying symptoms included nausea, a spinning sensation, and a feeling of warmth. On that day, he felt slightly better after breakfast, but by the afternoon, his condition improved. The following day, he experienced similar symptoms, albeit less severe. His dizziness intensifies when he turns over while lying in bed, and he often has to lie straight with his head elevated. He denies any new medications or allergies, but experiences nasal congestion in the morning, which resolves after 1.5 to 2 hours. He experienced dizziness 2 to 3 months ago, but it was not as severe as this current episode. He typically takes BP medication at night for blood pressure, but discontinued them when his symptoms began. He typically drinks water, but felt dehydrated after mowing the lawn on Saturday.    Supplemental Information  He had his eyes checked and got a slight change. He broke his ankle in the past.        Subjective    Review of systems:  Review of Systems     I have reviewed and the following portions of the patient's history were updated as appropriate: past family history, past medical history, past social history, past surgical history and problem list.    Medications:     Current Outpatient Medications:  "    Ascorbic Acid (Vitamin C) 100 MG chewable tablet, Vitamin C, Disp: , Rfl:     atenolol (TENORMIN) 25 MG tablet, Take 1 tablet by mouth Daily., Disp: 90 tablet, Rfl: 1    atorvastatin (LIPITOR) 20 MG tablet, Take 1 tablet by mouth Every Night., Disp: 90 tablet, Rfl: 1    CITRUS BERGAMOT PO, Take 1,000 mg by mouth Daily., Disp: , Rfl:     Cobalamin Combinations (B-12) 100-5000 MCG sublingual tablet, B12, Disp: , Rfl:     Glucosamine-Chondroit-Vit C-Mn (GLUCOSAMINE 1500 COMPLEX PO), Take 2,000 mg by mouth., Disp: , Rfl:     Methylsulfonylmethane (MSM PO), Take 50 mg by mouth Daily., Disp: , Rfl:     Multiple Vitamins-Minerals (MULTIVITAMIN ADULT EXTRA C PO), multivitamin, Disp: , Rfl:     Vitamin E 100 UNIT/GM cream, vitamin E, Disp: , Rfl:     ascorbic acid (VITAMIN C) 100 MG tablet, , Disp: , Rfl:     aspirin 81 MG EC tablet, Take 1 tablet by mouth Daily. (Patient not taking: Reported on 8/9/2024), Disp: , Rfl:     Cobalamin Combinations (B-12) 100-5000 MCG sublingual tablet, , Disp: , Rfl:     fluticasone (FLONASE) 50 MCG/ACT nasal spray, 2 sprays into the nostril(s) as directed by provider Daily., Disp: 18.2 mL, Rfl: 1    multivitamin with minerals (MULTIVITAMIN ADULTS PO), , Disp: , Rfl:     Allergies:   No Known Allergies    Objective   Vital Signs:   Vitals:    08/09/24 1313   BP: 134/68   Pulse: 67   Temp: 97.8 °F (36.6 °C)   TempSrc: Temporal   SpO2: 95%   Weight: 116 kg (256 lb)   Height: 177.8 cm (70\")   PainSc: 0-No pain     Body mass index is 36.73 kg/m².          Physical Exam:   Physical Exam  Vitals and nursing note reviewed.   Constitutional:       Appearance: Normal appearance.   HENT:      Head: Normocephalic and atraumatic.      Right Ear: Tympanic membrane and ear canal normal.      Left Ear: Tympanic membrane and ear canal normal.      Nose: No congestion or rhinorrhea.      Mouth/Throat:      Mouth: Mucous membranes are moist.      Pharynx: Oropharynx is clear. No posterior oropharyngeal " erythema.   Eyes:      General: No scleral icterus.     Extraocular Movements: Extraocular movements intact.      Pupils: Pupils are equal, round, and reactive to light.   Cardiovascular:      Rate and Rhythm: Normal rate and regular rhythm.   Pulmonary:      Effort: Pulmonary effort is normal.      Breath sounds: Normal breath sounds. No decreased breath sounds, wheezing, rhonchi or rales.   Musculoskeletal:      Cervical back: Neck supple.      Right lower leg: No edema.      Left lower leg: No edema.   Lymphadenopathy:      Cervical: No cervical adenopathy.   Neurological:      General: No focal deficit present.      Mental Status: He is alert.      Cranial Nerves: No cranial nerve deficit.          Procedures     Assessment / Plan    Assessment/Plan:   Diagnoses and all orders for this visit:    1. Dizziness (Primary)  -     Basic metabolic panel; Future  -     CBC No Differential; Future  -     fluticasone (FLONASE) 50 MCG/ACT nasal spray; 2 sprays into the nostril(s) as directed by provider Daily.  Dispense: 18.2 mL; Refill: 1  -     Vitamin B12; Future       Assessment & Plan  1. Vertigo.  The symptoms suggest vertigo. Laboratory tests will be conducted today to rule out other potential causes. He is advised to increase his fluid intake. Flonase will be trialed. He is advised to perform Epley maneuvers at home to observe if this alleviates his symptoms. Should his symptoms worsen or do not improve, he should inform me.      Follow Up:   No follow-ups on file.    Patient or patient representative verbalized consent for the use of Ambient Listening during the visit with  Ayah Torres PA-C for chart documentation. 8/22/2024  19:17 EDT    Ayah Torres PA-C   [facility] [place]

## 2024-08-10 LAB — VIT B12 BLD-MCNC: 436 PG/ML (ref 211–946)

## 2024-10-02 ENCOUNTER — LAB (OUTPATIENT)
Dept: LAB | Facility: HOSPITAL | Age: 74
End: 2024-10-02
Payer: MEDICARE

## 2024-10-02 ENCOUNTER — OFFICE VISIT (OUTPATIENT)
Dept: FAMILY MEDICINE CLINIC | Facility: CLINIC | Age: 74
End: 2024-10-02
Payer: MEDICARE

## 2024-10-02 VITALS
BODY MASS INDEX: 36.11 KG/M2 | HEART RATE: 68 BPM | TEMPERATURE: 97.7 F | HEIGHT: 70 IN | SYSTOLIC BLOOD PRESSURE: 126 MMHG | OXYGEN SATURATION: 94 % | WEIGHT: 252.2 LBS | DIASTOLIC BLOOD PRESSURE: 80 MMHG

## 2024-10-02 DIAGNOSIS — I10 ESSENTIAL HYPERTENSION: ICD-10-CM

## 2024-10-02 DIAGNOSIS — E78.5 HYPERLIPIDEMIA, UNSPECIFIED HYPERLIPIDEMIA TYPE: ICD-10-CM

## 2024-10-02 DIAGNOSIS — R73.03 PREDIABETES: ICD-10-CM

## 2024-10-02 DIAGNOSIS — Z00.00 MEDICARE ANNUAL WELLNESS VISIT, SUBSEQUENT: Primary | ICD-10-CM

## 2024-10-02 LAB
CHOLEST SERPL-MCNC: 144 MG/DL (ref 0–200)
HBA1C MFR BLD: 5.8 % (ref 4.8–5.6)
HDLC SERPL-MCNC: 36 MG/DL (ref 40–60)
LDLC SERPL CALC-MCNC: 81 MG/DL (ref 0–100)
LDLC/HDLC SERPL: 2.13 {RATIO}
TRIGL SERPL-MCNC: 157 MG/DL (ref 0–150)
VLDLC SERPL-MCNC: 27 MG/DL (ref 5–40)

## 2024-10-02 PROCEDURE — 3074F SYST BP LT 130 MM HG: CPT | Performed by: STUDENT IN AN ORGANIZED HEALTH CARE EDUCATION/TRAINING PROGRAM

## 2024-10-02 PROCEDURE — 3079F DIAST BP 80-89 MM HG: CPT | Performed by: STUDENT IN AN ORGANIZED HEALTH CARE EDUCATION/TRAINING PROGRAM

## 2024-10-02 PROCEDURE — G0439 PPPS, SUBSEQ VISIT: HCPCS | Performed by: STUDENT IN AN ORGANIZED HEALTH CARE EDUCATION/TRAINING PROGRAM

## 2024-10-02 PROCEDURE — 80061 LIPID PANEL: CPT

## 2024-10-02 PROCEDURE — 83036 HEMOGLOBIN GLYCOSYLATED A1C: CPT

## 2024-10-02 PROCEDURE — 1160F RVW MEDS BY RX/DR IN RCRD: CPT | Performed by: STUDENT IN AN ORGANIZED HEALTH CARE EDUCATION/TRAINING PROGRAM

## 2024-10-02 PROCEDURE — 1159F MED LIST DOCD IN RCRD: CPT | Performed by: STUDENT IN AN ORGANIZED HEALTH CARE EDUCATION/TRAINING PROGRAM

## 2024-10-02 PROCEDURE — 1126F AMNT PAIN NOTED NONE PRSNT: CPT | Performed by: STUDENT IN AN ORGANIZED HEALTH CARE EDUCATION/TRAINING PROGRAM

## 2024-10-02 RX ORDER — CHLORAL HYDRATE 500 MG
CAPSULE ORAL
COMMUNITY

## 2024-10-02 NOTE — ASSESSMENT & PLAN NOTE
- Last LDL within normal limits, for repeat, ordered today  - Continue atorvastatin 20 mg daily pending labs

## 2024-10-02 NOTE — PATIENT INSTRUCTIONS

## 2024-10-02 NOTE — PROGRESS NOTES
Subjective   The ABCs of the Annual Wellness Visit  Medicare Wellness Visit      Juan Hebert is a 74 y.o. patient who presents for a Medicare Wellness Visit.    Hypertension: Patient is currently on atenolol 25 mg daily.  He reports compliance with this medicine.  Denies any chest pain, dizziness, blurry vision.     HLD: Patient is currently on atorvastatin 20 mg daily. He reports compliance with his medication. He has been working on diet and exercise.     He has had a little bit of congestion in the mornings but it does get better as the day progresses.  He is not interested in trying any allergy medicines on a daily basis given that it is only bothering him in the morning    He has had some red spots appear on his abdomen and would like for me to take a look at them.  Patient would like a dermatology referral for a skin check.    The following portions of the patient's history were reviewed and   updated as appropriate: allergies, current medications, past family history, past medical history, past social history, past surgical history, and problem list.    Compared to one year ago, the patient's physical   health is better.  Compared to one year ago, the patient's mental   health is the same.    Recent Hospitalizations:  He was not admitted to the hospital during the last year.     Current Medical Providers:  Patient Care Team:  Anny Mills MD as PCP - General (Family Medicine)  Forest Bautista MD as Consulting Physician (Cardiology)  Priyanka Marti PA-C as Physician Assistant (Cardiology)    Outpatient Medications Prior to Visit   Medication Sig Dispense Refill    Ascorbic Acid (Vitamin C) 100 MG chewable tablet Vitamin C      atenolol (TENORMIN) 25 MG tablet Take 1 tablet by mouth Daily. 90 tablet 1    atorvastatin (LIPITOR) 20 MG tablet Take 1 tablet by mouth Every Night. 90 tablet 1    CITRUS BERGAMOT PO Take 1,000 mg by mouth Daily.      Cobalamin Combinations (B-12) 100-5000 MCG  sublingual tablet B12      Glucosamine-Chondroit-Vit C-Mn (GLUCOSAMINE 1500 COMPLEX PO) Take 2,000 mg by mouth.      Methylsulfonylmethane (MSM PO) Take 50 mg by mouth Daily.      Multiple Vitamins-Minerals (MULTIVITAMIN ADULT EXTRA C PO) multivitamin      Omega-3 Fatty Acids (fish oil) 1000 MG capsule capsule Take  by mouth Daily With Breakfast.      Vitamin E 100 UNIT/GM cream vitamin E      ascorbic acid (VITAMIN C) 100 MG tablet  (Patient not taking: Reported on 8/9/2024)      aspirin 81 MG EC tablet Take 1 tablet by mouth Daily. (Patient not taking: Reported on 8/9/2024)      Cobalamin Combinations (B-12) 100-5000 MCG sublingual tablet  (Patient not taking: Reported on 8/9/2024)      fluticasone (FLONASE) 50 MCG/ACT nasal spray 2 sprays into the nostril(s) as directed by provider Daily. (Patient not taking: Reported on 10/2/2024) 18.2 mL 1    multivitamin with minerals (MULTIVITAMIN ADULTS PO)  (Patient not taking: Reported on 8/9/2024)       No facility-administered medications prior to visit.     No opioid medication identified on active medication list. I have reviewed chart for other potential  high risk medication/s and harmful drug interactions in the elderly.      Aspirin is on active medication list. Aspirin use is not indicated based on review of current medical condition/s. Risk of harm outweighs potential benefits. Patient instructed to discontinue this medication.  .      Patient Active Problem List   Diagnosis    Essential hypertension    Hyperlipidemia    Diverticular disease of colon    Adenomatous polyp of colon    Annual physical exam    Murmur, cardiac    Obesity (BMI 30-39.9)    Exposure to asbestos    Moderate aortic regurgitation    Hypertension    Medicare annual wellness visit, subsequent    Prediabetes    Hx of smoking    Mass of right side of neck     Advance Care Planning Advance Directive is not on file.  ACP discussion was held with the patient during this visit. Patient does not  "have an advance directive, information provided.            Objective   Vitals:    10/02/24 0959   BP: 126/80   BP Location: Left arm   Patient Position: Sitting   Cuff Size: Adult   Pulse: 68   Temp: 97.7 °F (36.5 °C)   TempSrc: Infrared   SpO2: 94%   Weight: 114 kg (252 lb 3.2 oz)   Height: 177.8 cm (70\")   PainSc: 0-No pain       Estimated body mass index is 36.19 kg/m² as calculated from the following:    Height as of this encounter: 177.8 cm (70\").    Weight as of this encounter: 114 kg (252 lb 3.2 oz).     Physical Exam  Vitals reviewed.   Constitutional:       Appearance: Normal appearance.   HENT:      Head: Normocephalic.      Right Ear: External ear normal.      Left Ear: External ear normal.      Nose: Nose normal.      Mouth/Throat:      Mouth: Mucous membranes are moist.   Eyes:      Extraocular Movements: Extraocular movements intact.   Cardiovascular:      Rate and Rhythm: Normal rate and regular rhythm.      Heart sounds: Normal heart sounds.   Pulmonary:      Effort: Pulmonary effort is normal. No respiratory distress.      Breath sounds: Normal breath sounds.   Abdominal:      General: Abdomen is flat.      Palpations: Abdomen is soft.   Musculoskeletal:      Cervical back: No rigidity.      Comments: Moving all extremities spontaneously   Skin:     General: Skin is warm and dry.      Comments: Multiple angiomas present on abdomen   Neurological:      General: No focal deficit present.      Mental Status: He is alert and oriented to person, place, and time.   Psychiatric:         Mood and Affect: Mood normal.         Behavior: Behavior normal.        Does the patient have evidence of cognitive impairment? No                                                                                                Health  Risk Assessment    Smoking Status:  Social History     Tobacco Use   Smoking Status Former    Current packs/day: 0.00    Average packs/day: 1.3 packs/day for 8.0 years (10.1 ttl pk-yrs)    " Types: Cigarettes    Start date: 1968    Quit date: 1974    Years since quittin.4   Smokeless Tobacco Never     Alcohol Consumption:  Social History     Substance and Sexual Activity   Alcohol Use Not Currently    Comment: A glass once every 4 months.       Fall Risk Screen  ALVAREZADI Fall Risk Assessment was completed, and patient is at LOW risk for falls.Assessment completed on:10/2/2024    Depression Screening:      10/2/2024     9:59 AM   PHQ-2/PHQ-9 Depression Screening   Little Interest or Pleasure in Doing Things 0-->not at all   Feeling Down, Depressed or Hopeless 0-->not at all   PHQ-9: Brief Depression Severity Measure Score 0     Health Habits and Functional and Cognitive Screenin/25/2024     9:18 AM   Functional & Cognitive Status   Do you have difficulty preparing food and eating? No   Do you have difficulty bathing yourself, getting dressed or grooming yourself? No   Do you have difficulty using the toilet? No   Do you have difficulty moving around from place to place? No   Do you have trouble with steps or getting out of a bed or a chair? No   Current Diet Limited Junk Food   Dental Exam Up to date   Eye Exam Up to date   Exercise (times per week) 3 times per week   Current Exercises Include Light Weights;Walking;Yard Work   Do you need help using the phone?  No   Are you deaf or do you have serious difficulty hearing?  No   Do you need help to go to places out of walking distance? No   Do you need help shopping? No   Do you need help preparing meals?  No   Do you need help with housework?  No   Do you need help with laundry? No   Do you need help taking your medications? No   Do you need help managing money? No   Do you ever drive or ride in a car without wearing a seat belt? No   Have you felt unusual stress, anger or loneliness in the last month? No   Who do you live with? Spouse   If you need help, do you have trouble finding someone available to you? No   Have you been bothered  in the last four weeks by sexual problems? No   Do you have difficulty concentrating, remembering or making decisions? No           Age-appropriate Screening Schedule:  Refer to the list below for future screening recommendations based on patient's age, sex and/or medical conditions. Orders for these recommended tests are listed in the plan section. The patient has been provided with a written plan.    Health Maintenance List  Health Maintenance   Topic Date Due    Pneumococcal Vaccine 65+ (2 of 2 - PCV) 10/24/2015    INFLUENZA VACCINE  08/01/2024    LIPID PANEL  09/21/2024    ZOSTER VACCINE (1 of 2) 10/02/2024 (Originally 5/4/1998)    COVID-19 Vaccine (1 - 2023-24 season) 12/22/2024 (Originally 9/1/2024)    RSV Vaccine - Adults (1 - 1-dose 60+ series) 03/28/2025 (Originally 5/4/2008)    BMI FOLLOWUP  03/28/2025    ANNUAL WELLNESS VISIT  10/02/2025    TDAP/TD VACCINES (2 - Td or Tdap) 09/21/2032    HEPATITIS C SCREENING  Completed    COLORECTAL CANCER SCREENING  Discontinued                                                                                                                                                CMS Preventative Services Quick Reference  Risk Factors Identified During Encounter  Immunizations Discussed/Encouraged: Influenza and Prevnar 20 (Pneumococcal 20-valent conjugate)    The above risks/problems have been discussed with the patient.  Pertinent information has been shared with the patient in the After Visit Summary.  An After Visit Summary and PPPS were made available to the patient.        Assessment and Plan          Medicare annual wellness visit, subsequent  - Patient is overall doing well and has good support  - He is up-to-date on his colorectal cancer screening  - He does need pneumonia vaccine and shingles vaccine -not desiring pneumonia vaccine because it is Pfizer  - Patient to get shingles vaccine at pharmacy  - He will come back at the end of this month for his flu  vaccine  -Obtaining screening labs today  Hyperlipidemia, unspecified hyperlipidemia type  - Last LDL within normal limits, for repeat, ordered today  - Continue atorvastatin 20 mg daily pending labs  Essential hypertension   - Controlled  - Will continue atenolol 25 mg once daily  Prediabetes  - Patient with hemoglobin A1c in the prediabetic range in the past, repeating hemoglobin A1c today  - Continue diet and exercise, will continue to monitor    Orders Placed This Encounter   Procedures    Lipid Panel     Standing Status:   Future     Number of Occurrences:   1     Order Specific Question:   Release to patient     Answer:   Routine Release [[id number]]    Hemoglobin A1c     Standing Status:   Future     Number of Occurrences:   1     Order Specific Question:   Release to patient     Answer:   Routine Release [[id number]]    Ambulatory Referral to Dermatology     Referral Priority:   Routine     Referral Type:   Consultation     Referral Reason:   Specialty Services Required     [facility] Location[facility]   [facility]     Requested Specialty:   Dermatology     Number of Visits Requested:   1             Follow Up   Return in about 6 months (around 4/2/2025) for HTN and HLD.  Patient was given instructions and counseling regarding his condition or for health maintenance advice. Please see specific information pulled into the AVS if appropriate.

## 2024-10-03 NOTE — ASSESSMENT & PLAN NOTE
- Patient with hemoglobin A1c in the prediabetic range in the past, repeating hemoglobin A1c today  - Continue diet and exercise, will continue to monitor

## 2024-10-03 NOTE — ASSESSMENT & PLAN NOTE
- Patient is overall doing well and has good support  - He is up-to-date on his colorectal cancer screening  - He does need pneumonia vaccine and shingles vaccine -not desiring pneumonia vaccine because it is Pfizer  - Patient to get shingles vaccine at pharmacy  - He will come back at the end of this month for his flu vaccine  -Obtaining screening labs today

## 2024-10-29 ENCOUNTER — CLINICAL SUPPORT (OUTPATIENT)
Dept: FAMILY MEDICINE CLINIC | Facility: CLINIC | Age: 74
End: 2024-10-29
Payer: MEDICARE

## 2024-10-29 DIAGNOSIS — Z23 IMMUNIZATION DUE: Primary | ICD-10-CM

## 2024-10-29 PROCEDURE — 90662 IIV NO PRSV INCREASED AG IM: CPT | Performed by: STUDENT IN AN ORGANIZED HEALTH CARE EDUCATION/TRAINING PROGRAM

## 2024-10-29 PROCEDURE — G0008 ADMIN INFLUENZA VIRUS VAC: HCPCS | Performed by: STUDENT IN AN ORGANIZED HEALTH CARE EDUCATION/TRAINING PROGRAM

## 2024-10-31 DIAGNOSIS — I35.1 MODERATE AORTIC REGURGITATION: ICD-10-CM

## 2024-10-31 DIAGNOSIS — R01.1 MURMUR, CARDIAC: Primary | ICD-10-CM

## 2024-11-01 DIAGNOSIS — E78.5 HYPERLIPIDEMIA, UNSPECIFIED HYPERLIPIDEMIA TYPE: ICD-10-CM

## 2024-11-01 RX ORDER — ATORVASTATIN CALCIUM 20 MG/1
20 TABLET, FILM COATED ORAL NIGHTLY
Qty: 90 TABLET | Refills: 0 | Status: SHIPPED | OUTPATIENT
Start: 2024-11-01

## 2025-01-30 DIAGNOSIS — E78.5 HYPERLIPIDEMIA, UNSPECIFIED HYPERLIPIDEMIA TYPE: ICD-10-CM

## 2025-01-30 RX ORDER — ATORVASTATIN CALCIUM 20 MG/1
20 TABLET, FILM COATED ORAL NIGHTLY
Qty: 90 TABLET | Refills: 0 | Status: SHIPPED | OUTPATIENT
Start: 2025-01-30

## 2025-02-13 DIAGNOSIS — I10 HYPERTENSION, UNSPECIFIED TYPE: ICD-10-CM

## 2025-02-13 RX ORDER — ATENOLOL 25 MG/1
25 TABLET ORAL DAILY
Qty: 90 TABLET | Refills: 1 | Status: SHIPPED | OUTPATIENT
Start: 2025-02-13

## 2025-03-06 ENCOUNTER — HOSPITAL ENCOUNTER (OUTPATIENT)
Dept: CARDIOLOGY | Facility: HOSPITAL | Age: 75
Discharge: HOME | End: 2025-03-06
Admitting: INTERNAL MEDICINE
Payer: MEDICARE

## 2025-03-06 ENCOUNTER — OFFICE VISIT (OUTPATIENT)
Dept: CARDIOLOGY | Facility: CLINIC | Age: 75
End: 2025-03-06
Payer: MEDICARE

## 2025-03-06 VITALS
WEIGHT: 252 LBS | BODY MASS INDEX: 36.08 KG/M2 | SYSTOLIC BLOOD PRESSURE: 147 MMHG | DIASTOLIC BLOOD PRESSURE: 81 MMHG | HEIGHT: 70 IN

## 2025-03-06 VITALS
OXYGEN SATURATION: 98 % | BODY MASS INDEX: 36.36 KG/M2 | SYSTOLIC BLOOD PRESSURE: 126 MMHG | WEIGHT: 254 LBS | HEART RATE: 57 BPM | DIASTOLIC BLOOD PRESSURE: 78 MMHG | HEIGHT: 70 IN

## 2025-03-06 DIAGNOSIS — I35.1 MODERATE AORTIC REGURGITATION: ICD-10-CM

## 2025-03-06 DIAGNOSIS — R01.1 MURMUR, CARDIAC: ICD-10-CM

## 2025-03-06 DIAGNOSIS — I10 ESSENTIAL HYPERTENSION: ICD-10-CM

## 2025-03-06 DIAGNOSIS — R01.1 MURMUR, CARDIAC: Primary | ICD-10-CM

## 2025-03-06 DIAGNOSIS — I35.1 MODERATE AORTIC REGURGITATION: Primary | ICD-10-CM

## 2025-03-06 DIAGNOSIS — E78.2 MIXED HYPERLIPIDEMIA: ICD-10-CM

## 2025-03-06 LAB
AV MEAN PRESS GRAD SYS DOP V1V2: 5 MMHG
AV VMAX SYS DOP: 149.7 CM/SEC
BH CV ECHO MEAS - AI P1/2T: 436 MSEC
BH CV ECHO MEAS - AO MAX PG: 9 MMHG
BH CV ECHO MEAS - AO ROOT AREA (BSA CORRECTED): 1.8 CM2
BH CV ECHO MEAS - AO ROOT DIAM: 4.1 CM
BH CV ECHO MEAS - AO V2 VTI: 30.7 CM
BH CV ECHO MEAS - AVA(I,D): 3.8 CM2
BH CV ECHO MEAS - IVS/LVPW: 1 CM
BH CV ECHO MEAS - IVSD: 1.2 CM
BH CV ECHO MEAS - LA DIMENSION: 5.5 CM
BH CV ECHO MEAS - LAT PEAK E' VEL: 11 CM/SEC
BH CV ECHO MEAS - LV MAX PG: 8.4 MMHG
BH CV ECHO MEAS - LV MEAN PG: 3.8 MMHG
BH CV ECHO MEAS - LV V1 MAX: 144.5 CM/SEC
BH CV ECHO MEAS - LV V1 VTI: 30.5 CM
BH CV ECHO MEAS - LVIDD: 5.3 CM
BH CV ECHO MEAS - LVIDS: 3.1 CM
BH CV ECHO MEAS - LVOT AREA: 3.8 CM2
BH CV ECHO MEAS - LVOT DIAM: 2.2 CM
BH CV ECHO MEAS - LVPWD: 1.2 CM
BH CV ECHO MEAS - MED PEAK E' VEL: 5.1 CM/SEC
BH CV ECHO MEAS - MV A MAX VEL: 63.8 CM/SEC
BH CV ECHO MEAS - MV DEC SLOPE: 225.1 CM/SEC2
BH CV ECHO MEAS - MV E MAX VEL: 71.8 CM/SEC
BH CV ECHO MEAS - MV E/A: 1.13
BH CV ECHO MEAS - MV MAX PG: 1.47 MMHG
BH CV ECHO MEAS - MV MEAN PG: 0.8 MMHG
BH CV ECHO MEAS - MV V2 VTI: 28 CM
BH CV ECHO MEAS - MVA(VTI): 4.1 CM2
BH CV ECHO MEAS - PA ACC TIME: 0.1 SEC
BH CV ECHO MEAS - PA V2 MAX: 70.8 CM/SEC
BH CV ECHO MEAS - RAP SYSTOLE: 3 MMHG
BH CV ECHO MEAS - RVSP: 25 MMHG
BH CV ECHO MEAS - SV(LVOT): 115.8 ML
BH CV ECHO MEAS - SVI(LVOT): 50.3 ML/M2
BH CV ECHO MEAS - TAPSE (>1.6): 2.14 CM
BH CV ECHO MEAS - TR MAX PG: 22.2 MMHG
BH CV ECHO MEAS - TR MAX VEL: 235.8 CM/SEC
BH CV ECHO MEASUREMENTS AVERAGE E/E' RATIO: 8.92
BH CV VAS BP LEFT ARM: NORMAL MMHG
BH CV XLRA - RV BASE: 4.1 CM
BH CV XLRA - RV MID: 3 CM
BH CV XLRA - TDI S': 16.5 CM/SEC
LEFT ATRIUM VOLUME INDEX: 27.8 ML/M2
LV EF BIPLANE MOD: 59.4 %

## 2025-03-06 PROCEDURE — 3078F DIAST BP <80 MM HG: CPT | Performed by: INTERNAL MEDICINE

## 2025-03-06 PROCEDURE — 93306 TTE W/DOPPLER COMPLETE: CPT

## 2025-03-06 PROCEDURE — 93000 ELECTROCARDIOGRAM COMPLETE: CPT | Performed by: INTERNAL MEDICINE

## 2025-03-06 PROCEDURE — 99214 OFFICE O/P EST MOD 30 MIN: CPT | Performed by: INTERNAL MEDICINE

## 2025-03-06 PROCEDURE — 3074F SYST BP LT 130 MM HG: CPT | Performed by: INTERNAL MEDICINE

## 2025-03-06 NOTE — PROGRESS NOTES
OFFICE VISIT  NOTE  [facility] CARDIOLOGY      Name: Juan Hebert    Date: 3/6/2025  MRN:  [id number]  :  1950      REFERRING/PRIMARY PROVIDER:  Anny Mills MD     Chief Complaint   Patient presents with    Moderate aortic regurgitation       HPI: Juan Hebert is a 74 y.o. male who presents for aortic regurgitation.  Moved from [place] to [place] 10/2022.  Followed with a cardiologist Dr. MONTGOMERY in [place] since  for moderate aortic regurgitation.  On one echo in  it was called moderate to severe but most recent echo 10/2021 it was called moderate with EF of 60%.  He had a stress echo that was negative for ischemia 10/2021 in [place].    Still walking about 2 miles per day while walking his dog.  Is gained about 25 pounds over the last 2 years, he states it is due to comfort food.  He is working on his diet he and his wife eating more salads.  Denies worsening shortness of breath or chest pressure or pain.    ROS:Pertinent positives as listed in the HPI.  All other systems reviewed and negative.    Past Medical History:   Diagnosis Date    Allergic     Arthritis 6 yrs. Ago    Hyperlipidemia     Hypertension 20 years ago    Obesity 40 years ago    Visual impairment All my life       History reviewed. No pertinent surgical history.    Social History     Socioeconomic History    Marital status:    Tobacco Use    Smoking status: Former     Current packs/day: 0.00     Average packs/day: 1.3 packs/day for 8.0 years (10.1 ttl pk-yrs)     Types: Cigarettes     Start date: 1968     Quit date: 1974     Years since quittin.8    Smokeless tobacco: Never   Vaping Use    Vaping status: Never Used    Passive vaping exposure: Yes   Substance and Sexual Activity    Alcohol use: Not Currently     Comment: A glass once every 4 months.    Drug use: Never    Sexual activity: Yes     Partners: Female       Family History   Problem Relation Age of Onset    Cancer  "Mother     Heart disease Father         Heart attack at age 62    Heart disease Brother     Heart disease Sister         No Known Allergies    Current Outpatient Medications   Medication Instructions    Ascorbic Acid (Vitamin C) 100 MG chewable tablet Vitamin C    atenolol (TENORMIN) 25 mg, Oral, Daily    atorvastatin (LIPITOR) 20 mg, Oral, Nightly    CITRUS BERGAMOT PO 1,000 mg, Daily    Cobalamin Combinations (B-12) 100-5000 MCG sublingual tablet B12    Glucosamine-Chondroit-Vit C-Mn (GLUCOSAMINE 1500 COMPLEX PO) 2,000 mg    Methylsulfonylmethane (MSM PO) 50 mg, Daily    Multiple Vitamins-Minerals (MULTIVITAMIN ADULT EXTRA C PO) multivitamin    Omega-3 Fatty Acids (fish oil) 1000 MG capsule capsule Daily With Breakfast    Vitamin E 100 UNIT/GM cream vitamin E       Vitals:    03/06/25 0959   BP: 126/78   BP Location: Right arm   Patient Position: Sitting   Pulse: 57   SpO2: 98%   Weight: 115 kg (254 lb)   Height: 177.8 cm (70\")       Body mass index is 36.45 kg/m².    PHYSICAL EXAM:    General Appearance:   · well developed  · well nourished  Neck:  · thyroid not enlarged  · supple  Respiratory:  · no respiratory distress  · normal breath sounds  · no rales  Cardiovascular:  · no jugular venous distention  · regular rhythm  · apical impulse normal  · S1 normal, S2 normal  · no S3, no S4   · no murmur  · no rub, no thrill  · carotid pulses normal; no bruit  · pedal pulses normal  · lower extremity edema: none    Skin:   warm, dry      RESULTS:     ECG 12 Lead    Date/Time: 3/6/2025 10:21 AM  Performed by: Forest Bautista MD    Authorized by: Forest Bautista MD  Comparison: compared with previous ECG from 11/3/2022  Similar to previous ECG  Rhythm: sinus bradycardia  Rate: bradycardic  BPM: 57  Conduction: right bundle branch block  QRS axis: left    Clinical impression: non-specific ECG  Comments: Unchanged          Results for orders placed during the hospital encounter of 03/06/25    Adult Transthoracic Echo " "Complete W/ Cont if Necessary Per Protocol    Interpretation Summary    Left ventricular systolic function is normal. Calculated left ventricular EF = 59.4% Left ventricular ejection fraction appears to be 56 - 60%.    Left ventricular wall thickness is consistent with mild concentric hypertrophy.    Left ventricular diastolic function was normal.    The right ventricular cavity is borderline dilated.    The left atrial cavity is dilated.    Moderate aortic valve regurgitation is present.  Pressure half-time 436 ms, slightly improved compared to previous echo from 12/2023.    Estimated right ventricular systolic pressure from tricuspid regurgitation is normal (<35 mmHg). Calculated right ventricular systolic pressure from tricuspid regurgitation is 25 mmHg.    The aortic root measures 4.1 cm. The aortic root (corrected for BSA) measures 1.8 cm.  Mild, stable.    No significant change.  Echo from 12/2023.  Discussed findings with patient in office on 3/6/2025.        Labs:  Lab Results   Component Value Date    CHOL 144 10/02/2024    TRIG 157 (H) 10/02/2024    HDL 36 (L) 10/02/2024    LDL 81 10/02/2024    AST 33 03/28/2024    ALT 38 03/28/2024     Lab Results   Component Value Date    HGBA1C 5.80 (H) 10/02/2024     Creatinine   Date Value Ref Range Status   08/09/2024 1.24 0.76 - 1.27 mg/dL Final   03/28/2024 1.23 0.76 - 1.27 mg/dL Final   09/21/2023 1.06 0.76 - 1.27 mg/dL Final     No results found for: \"EGFRIFNONA\"      ASSESSMENT:  Problem List Items Addressed This Visit       Essential hypertension    Mixed hyperlipidemia    Moderate aortic regurgitation - Primary       PLAN:    1.  Moderate aortic regurgitation:  Echocardiogram 12/2022 with normal EF, moderate AI  Repeat echo 12/2023 with normal EF, moderate AI, stable compared to prior echo   Echo 3/625 reviewed with patient in office today, stable, moderate aortic regurgitation, no change in EF or LV dimensions.  EKG today 3/6/2025 reviewed, unchanged from " previous.    Repeat echo in 1 year at follow-up visit     2.  Hypertension:  Long-term goal blood pressure less than 130/80  Well-controlled on current regimen.     3.  Hyperlipidemia:  Goal LDL less than 100  On Atorvastatin 20mg   Recent LDL 81, continue Lipitor and diet modifications      Agree with no need for daily aspirin for primary     4.  Obesity:  BMI 36  We discussed the importance of weight loss, he is gained about 25 pounds over the last 2 years, increase aerobic exercise and decrease calories for calorie deficit    Advance Care Planning   ACP discussion was held with the patient during this visit. Patient has an advance directive (not in EMR), copy requested.          Follow-up   Return in about 1 year (around 3/6/2026).    Forest Bautista MD, FACC, [facility]  Interventional Cardiology

## 2025-04-03 ENCOUNTER — OFFICE VISIT (OUTPATIENT)
Dept: FAMILY MEDICINE CLINIC | Facility: CLINIC | Age: 75
End: 2025-04-03
Payer: MEDICARE

## 2025-04-03 ENCOUNTER — LAB (OUTPATIENT)
Dept: LAB | Facility: HOSPITAL | Age: 75
End: 2025-04-03
Payer: MEDICARE

## 2025-04-03 VITALS
BODY MASS INDEX: 36.31 KG/M2 | HEART RATE: 55 BPM | TEMPERATURE: 97.7 F | HEIGHT: 70 IN | DIASTOLIC BLOOD PRESSURE: 74 MMHG | WEIGHT: 253.6 LBS | SYSTOLIC BLOOD PRESSURE: 130 MMHG | OXYGEN SATURATION: 96 %

## 2025-04-03 DIAGNOSIS — E78.5 HYPERLIPIDEMIA, UNSPECIFIED HYPERLIPIDEMIA TYPE: ICD-10-CM

## 2025-04-03 DIAGNOSIS — I10 ESSENTIAL HYPERTENSION: Primary | ICD-10-CM

## 2025-04-03 DIAGNOSIS — I10 ESSENTIAL HYPERTENSION: ICD-10-CM

## 2025-04-03 LAB
ALBUMIN SERPL-MCNC: 4.6 G/DL (ref 3.5–5.2)
ALBUMIN/GLOB SERPL: 1.3 G/DL
ALP SERPL-CCNC: 85 U/L (ref 39–117)
ALT SERPL W P-5'-P-CCNC: 37 U/L (ref 1–41)
ANION GAP SERPL CALCULATED.3IONS-SCNC: 11.3 MMOL/L (ref 5–15)
AST SERPL-CCNC: 40 U/L (ref 1–40)
BILIRUB SERPL-MCNC: 0.5 MG/DL (ref 0–1.2)
BUN SERPL-MCNC: 15 MG/DL (ref 8–23)
BUN/CREAT SERPL: 12.9 (ref 7–25)
CALCIUM SPEC-SCNC: 10.1 MG/DL (ref 8.6–10.5)
CHLORIDE SERPL-SCNC: 103 MMOL/L (ref 98–107)
CO2 SERPL-SCNC: 23.7 MMOL/L (ref 22–29)
CREAT SERPL-MCNC: 1.16 MG/DL (ref 0.76–1.27)
EGFRCR SERPLBLD CKD-EPI 2021: 65.3 ML/MIN/1.73
GLOBULIN UR ELPH-MCNC: 3.5 GM/DL
GLUCOSE SERPL-MCNC: 84 MG/DL (ref 65–99)
POTASSIUM SERPL-SCNC: 4.7 MMOL/L (ref 3.5–5.2)
PROT SERPL-MCNC: 8.1 G/DL (ref 6–8.5)
SODIUM SERPL-SCNC: 138 MMOL/L (ref 136–145)

## 2025-04-03 PROCEDURE — 3078F DIAST BP <80 MM HG: CPT | Performed by: STUDENT IN AN ORGANIZED HEALTH CARE EDUCATION/TRAINING PROGRAM

## 2025-04-03 PROCEDURE — 1160F RVW MEDS BY RX/DR IN RCRD: CPT | Performed by: STUDENT IN AN ORGANIZED HEALTH CARE EDUCATION/TRAINING PROGRAM

## 2025-04-03 PROCEDURE — 1126F AMNT PAIN NOTED NONE PRSNT: CPT | Performed by: STUDENT IN AN ORGANIZED HEALTH CARE EDUCATION/TRAINING PROGRAM

## 2025-04-03 PROCEDURE — 80053 COMPREHEN METABOLIC PANEL: CPT

## 2025-04-03 PROCEDURE — 3075F SYST BP GE 130 - 139MM HG: CPT | Performed by: STUDENT IN AN ORGANIZED HEALTH CARE EDUCATION/TRAINING PROGRAM

## 2025-04-03 PROCEDURE — 1159F MED LIST DOCD IN RCRD: CPT | Performed by: STUDENT IN AN ORGANIZED HEALTH CARE EDUCATION/TRAINING PROGRAM

## 2025-04-03 PROCEDURE — 99214 OFFICE O/P EST MOD 30 MIN: CPT | Performed by: STUDENT IN AN ORGANIZED HEALTH CARE EDUCATION/TRAINING PROGRAM

## 2025-04-03 PROCEDURE — 36415 COLL VENOUS BLD VENIPUNCTURE: CPT

## 2025-04-03 RX ORDER — ATORVASTATIN CALCIUM 20 MG/1
20 TABLET, FILM COATED ORAL NIGHTLY
Qty: 90 TABLET | Refills: 1 | Status: SHIPPED | OUTPATIENT
Start: 2025-04-03

## 2025-04-03 NOTE — ASSESSMENT & PLAN NOTE
- Controlled  - Will continue atenolol 25 mg once daily  -Obtaining CMP, will follow-up on results and advise.

## 2025-04-03 NOTE — PROGRESS NOTES
"    Office Note     Name: Juan Hebert    : 1950     MRN: [id number]     Chief Complaint  Hypertension and Hyperlipidemia    Subjective     History of Present Illness:  Juan Hebert is a 74 y.o. male who presents today for HTN and HLD.     Hypertension: Patient is currently on atenolol 25 mg daily.  He reports compliance with this medicine.  Denies any chest pain, dizziness, blurry vision.     HLD: Patient is currently on atorvastatin 20 mg daily. He reports compliance with his medication. He has been working on diet and exercise.           Objective     Past Medical History:   Diagnosis Date    Allergic     Arthritis 6 yrs. Ago    Hyperlipidemia     Hypertension 20 years ago    Obesity 40 years ago    Visual impairment All my life     No past surgical history on file.  Family History   Problem Relation Age of Onset    Cancer Mother     Heart disease Father         Heart attack at age 62    Heart disease Brother     Heart disease Sister        Vital Signs  /74   Pulse 55   Temp 97.7 °F (36.5 °C) (Infrared)   Ht 177.8 cm (70\")   Wt 115 kg (253 lb 9.6 oz)   SpO2 96%   BMI 36.39 kg/m²   Estimated body mass index is 36.39 kg/m² as calculated from the following:    Height as of this encounter: 177.8 cm (70\").    Weight as of this encounter: 115 kg (253 lb 9.6 oz).    Physical Exam  Vitals reviewed.   Constitutional:       Appearance: Normal appearance.   HENT:      Nose: Nose normal.      Mouth/Throat:      Mouth: Mucous membranes are moist.   Eyes:      Conjunctiva/sclera: Conjunctivae normal.   Cardiovascular:      Rate and Rhythm: Regular rhythm. Bradycardia present.      Heart sounds: Murmur heard.   Pulmonary:      Effort: Pulmonary effort is normal.   Abdominal:      General: Abdomen is flat.   Neurological:      Mental Status: He is alert.               Assessment and Plan     Diagnoses and all orders for this visit:    1. Essential hypertension (Primary)  Assessment & Plan:  - " Controlled  - Will continue atenolol 25 mg once daily  -Obtaining CMP, will follow-up on results and advise.     Orders:  -     Comprehensive metabolic panel; Future    2. Hyperlipidemia, unspecified hyperlipidemia type  Assessment & Plan:  - Last LDL at goal  - Will continue atorvastatin 20 mg daily    Orders:  -     atorvastatin (LIPITOR) 20 MG tablet; Take 1 tablet by mouth Every Night.  Dispense: 90 tablet; Refill: 1      Follow Up  Return in about 6 months (around 10/3/2025) for Medicare Wellness.    Anny Mills MD

## 2025-08-12 DIAGNOSIS — I10 HYPERTENSION, UNSPECIFIED TYPE: ICD-10-CM

## 2025-08-12 RX ORDER — ATENOLOL 25 MG/1
25 TABLET ORAL DAILY
Qty: 90 TABLET | Refills: 1 | Status: SHIPPED | OUTPATIENT
Start: 2025-08-12